# Patient Record
Sex: FEMALE | Race: BLACK OR AFRICAN AMERICAN | NOT HISPANIC OR LATINO
[De-identification: names, ages, dates, MRNs, and addresses within clinical notes are randomized per-mention and may not be internally consistent; named-entity substitution may affect disease eponyms.]

---

## 2017-08-01 ENCOUNTER — APPOINTMENT (OUTPATIENT)
Dept: OTOLARYNGOLOGY | Facility: CLINIC | Age: 72
End: 2017-08-01

## 2024-05-31 ENCOUNTER — INPATIENT (INPATIENT)
Facility: HOSPITAL | Age: 79
LOS: 5 days | Discharge: HOME | End: 2024-06-06
Attending: INTERNAL MEDICINE | Admitting: INTERNAL MEDICINE
Payer: MEDICARE

## 2024-05-31 VITALS
WEIGHT: 134.92 LBS | DIASTOLIC BLOOD PRESSURE: 86 MMHG | HEART RATE: 63 BPM | RESPIRATION RATE: 25 BRPM | OXYGEN SATURATION: 100 % | HEIGHT: 68 IN | TEMPERATURE: 98 F | SYSTOLIC BLOOD PRESSURE: 142 MMHG

## 2024-05-31 DIAGNOSIS — R06.02 SHORTNESS OF BREATH: ICD-10-CM

## 2024-05-31 DIAGNOSIS — R06.00 DYSPNEA, UNSPECIFIED: ICD-10-CM

## 2024-05-31 DIAGNOSIS — R55 SYNCOPE AND COLLAPSE: ICD-10-CM

## 2024-05-31 DIAGNOSIS — R00.1 BRADYCARDIA, UNSPECIFIED: ICD-10-CM

## 2024-05-31 DIAGNOSIS — Z86.73 PERSONAL HISTORY OF TRANSIENT ISCHEMIC ATTACK (TIA), AND CEREBRAL INFARCTION WITHOUT RESIDUAL DEFICITS: ICD-10-CM

## 2024-05-31 LAB
ALBUMIN SERPL ELPH-MCNC: 4.2 G/DL — SIGNIFICANT CHANGE UP (ref 3.3–5)
ALP SERPL-CCNC: 74 U/L — SIGNIFICANT CHANGE UP (ref 40–120)
ALT FLD-CCNC: 20 U/L — SIGNIFICANT CHANGE UP (ref 4–33)
ANION GAP SERPL CALC-SCNC: 13 MMOL/L — SIGNIFICANT CHANGE UP (ref 7–14)
APTT BLD: 30.1 SEC — SIGNIFICANT CHANGE UP (ref 24.5–35.6)
AST SERPL-CCNC: 28 U/L — SIGNIFICANT CHANGE UP (ref 4–32)
BASE EXCESS BLDV CALC-SCNC: -0.7 MMOL/L — SIGNIFICANT CHANGE UP (ref -2–3)
BASE EXCESS BLDV CALC-SCNC: 0.8 MMOL/L — SIGNIFICANT CHANGE UP (ref -2–3)
BASOPHILS # BLD AUTO: 0.05 K/UL — SIGNIFICANT CHANGE UP (ref 0–0.2)
BASOPHILS NFR BLD AUTO: 1 % — SIGNIFICANT CHANGE UP (ref 0–2)
BILIRUB SERPL-MCNC: 0.3 MG/DL — SIGNIFICANT CHANGE UP (ref 0.2–1.2)
BLOOD GAS VENOUS COMPREHENSIVE RESULT: SIGNIFICANT CHANGE UP
BLOOD GAS VENOUS COMPREHENSIVE RESULT: SIGNIFICANT CHANGE UP
BUN SERPL-MCNC: 14 MG/DL — SIGNIFICANT CHANGE UP (ref 7–23)
CALCIUM SERPL-MCNC: 9.9 MG/DL — SIGNIFICANT CHANGE UP (ref 8.4–10.5)
CHLORIDE BLDV-SCNC: 107 MMOL/L — SIGNIFICANT CHANGE UP (ref 96–108)
CHLORIDE BLDV-SCNC: 108 MMOL/L — SIGNIFICANT CHANGE UP (ref 96–108)
CHLORIDE SERPL-SCNC: 107 MMOL/L — SIGNIFICANT CHANGE UP (ref 98–107)
CK MB BLD-MCNC: 1.3 % — SIGNIFICANT CHANGE UP (ref 0–2.5)
CK MB CFR SERPL CALC: 1.3 NG/ML — SIGNIFICANT CHANGE UP
CK SERPL-CCNC: 97 U/L — SIGNIFICANT CHANGE UP (ref 25–170)
CO2 BLDV-SCNC: 25 MMOL/L — SIGNIFICANT CHANGE UP (ref 22–26)
CO2 BLDV-SCNC: 26.3 MMOL/L — HIGH (ref 22–26)
CO2 SERPL-SCNC: 22 MMOL/L — SIGNIFICANT CHANGE UP (ref 22–31)
CREAT SERPL-MCNC: 0.73 MG/DL — SIGNIFICANT CHANGE UP (ref 0.5–1.3)
EGFR: 84 ML/MIN/1.73M2 — SIGNIFICANT CHANGE UP
EOSINOPHIL # BLD AUTO: 0.17 K/UL — SIGNIFICANT CHANGE UP (ref 0–0.5)
EOSINOPHIL NFR BLD AUTO: 3.3 % — SIGNIFICANT CHANGE UP (ref 0–6)
FLUAV AG NPH QL: SIGNIFICANT CHANGE UP
FLUBV AG NPH QL: SIGNIFICANT CHANGE UP
GAS PNL BLDV: 138 MMOL/L — SIGNIFICANT CHANGE UP (ref 136–145)
GAS PNL BLDV: 140 MMOL/L — SIGNIFICANT CHANGE UP (ref 136–145)
GAS PNL BLDV: SIGNIFICANT CHANGE UP
GAS PNL BLDV: SIGNIFICANT CHANGE UP
GLUCOSE BLDV-MCNC: 77 MG/DL — SIGNIFICANT CHANGE UP (ref 70–99)
GLUCOSE BLDV-MCNC: 79 MG/DL — SIGNIFICANT CHANGE UP (ref 70–99)
GLUCOSE SERPL-MCNC: 79 MG/DL — SIGNIFICANT CHANGE UP (ref 70–99)
HCO3 BLDV-SCNC: 24 MMOL/L — SIGNIFICANT CHANGE UP (ref 22–29)
HCO3 BLDV-SCNC: 25 MMOL/L — SIGNIFICANT CHANGE UP (ref 22–29)
HCT VFR BLD CALC: 34.8 % — SIGNIFICANT CHANGE UP (ref 34.5–45)
HCT VFR BLDA CALC: 36 % — SIGNIFICANT CHANGE UP (ref 34.5–46.5)
HCT VFR BLDA CALC: 38 % — SIGNIFICANT CHANGE UP (ref 34.5–46.5)
HGB BLD CALC-MCNC: 12 G/DL — SIGNIFICANT CHANGE UP (ref 11.7–16.1)
HGB BLD CALC-MCNC: 12.7 G/DL — SIGNIFICANT CHANGE UP (ref 11.7–16.1)
HGB BLD-MCNC: 12.3 G/DL — SIGNIFICANT CHANGE UP (ref 11.5–15.5)
IANC: 2.19 K/UL — SIGNIFICANT CHANGE UP (ref 1.8–7.4)
IMM GRANULOCYTES NFR BLD AUTO: 0.2 % — SIGNIFICANT CHANGE UP (ref 0–0.9)
INR BLD: 1.04 RATIO — SIGNIFICANT CHANGE UP (ref 0.85–1.18)
LACTATE BLDV-MCNC: 1.2 MMOL/L — SIGNIFICANT CHANGE UP (ref 0.5–2)
LACTATE BLDV-MCNC: 2.3 MMOL/L — HIGH (ref 0.5–2)
LYMPHOCYTES # BLD AUTO: 1.92 K/UL — SIGNIFICANT CHANGE UP (ref 1–3.3)
LYMPHOCYTES # BLD AUTO: 37.4 % — SIGNIFICANT CHANGE UP (ref 13–44)
MCHC RBC-ENTMCNC: 33.8 PG — SIGNIFICANT CHANGE UP (ref 27–34)
MCHC RBC-ENTMCNC: 35.3 GM/DL — SIGNIFICANT CHANGE UP (ref 32–36)
MCV RBC AUTO: 95.6 FL — SIGNIFICANT CHANGE UP (ref 80–100)
MONOCYTES # BLD AUTO: 0.8 K/UL — SIGNIFICANT CHANGE UP (ref 0–0.9)
MONOCYTES NFR BLD AUTO: 15.6 % — HIGH (ref 2–14)
NEUTROPHILS # BLD AUTO: 2.19 K/UL — SIGNIFICANT CHANGE UP (ref 1.8–7.4)
NEUTROPHILS NFR BLD AUTO: 42.5 % — LOW (ref 43–77)
NRBC # BLD: 0 /100 WBCS — SIGNIFICANT CHANGE UP (ref 0–0)
NRBC # FLD: 0 K/UL — SIGNIFICANT CHANGE UP (ref 0–0)
NT-PROBNP SERPL-SCNC: 111 PG/ML — SIGNIFICANT CHANGE UP
PCO2 BLDV: 33 MMHG — LOW (ref 39–52)
PCO2 BLDV: 44 MMHG — SIGNIFICANT CHANGE UP (ref 39–52)
PH BLDV: 7.36 — SIGNIFICANT CHANGE UP (ref 7.32–7.43)
PH BLDV: 7.47 — HIGH (ref 7.32–7.43)
PLATELET # BLD AUTO: 223 K/UL — SIGNIFICANT CHANGE UP (ref 150–400)
PO2 BLDV: 20 MMHG — LOW (ref 25–45)
PO2 BLDV: <20 MMHG — LOW (ref 25–45)
POTASSIUM BLDV-SCNC: 3.8 MMOL/L — SIGNIFICANT CHANGE UP (ref 3.5–5.1)
POTASSIUM BLDV-SCNC: 4.2 MMOL/L — SIGNIFICANT CHANGE UP (ref 3.5–5.1)
POTASSIUM SERPL-MCNC: 3.8 MMOL/L — SIGNIFICANT CHANGE UP (ref 3.5–5.3)
POTASSIUM SERPL-SCNC: 3.8 MMOL/L — SIGNIFICANT CHANGE UP (ref 3.5–5.3)
PROT SERPL-MCNC: 7.4 G/DL — SIGNIFICANT CHANGE UP (ref 6–8.3)
PROTHROM AB SERPL-ACNC: 11.6 SEC — SIGNIFICANT CHANGE UP (ref 9.5–13)
RBC # BLD: 3.64 M/UL — LOW (ref 3.8–5.2)
RBC # FLD: 14 % — SIGNIFICANT CHANGE UP (ref 10.3–14.5)
RSV RNA NPH QL NAA+NON-PROBE: SIGNIFICANT CHANGE UP
SAO2 % BLDV: 23.5 % — LOW (ref 67–88)
SAO2 % BLDV: 24.6 % — LOW (ref 67–88)
SARS-COV-2 RNA SPEC QL NAA+PROBE: SIGNIFICANT CHANGE UP
SODIUM SERPL-SCNC: 142 MMOL/L — SIGNIFICANT CHANGE UP (ref 135–145)
TROPONIN T, HIGH SENSITIVITY RESULT: 10 NG/L — SIGNIFICANT CHANGE UP
TROPONIN T, HIGH SENSITIVITY RESULT: 10 NG/L — SIGNIFICANT CHANGE UP
WBC # BLD: 5.14 K/UL — SIGNIFICANT CHANGE UP (ref 3.8–10.5)
WBC # FLD AUTO: 5.14 K/UL — SIGNIFICANT CHANGE UP (ref 3.8–10.5)

## 2024-05-31 PROCEDURE — 71275 CT ANGIOGRAPHY CHEST: CPT | Mod: 26,MC

## 2024-05-31 PROCEDURE — 99223 1ST HOSP IP/OBS HIGH 75: CPT

## 2024-05-31 PROCEDURE — 71045 X-RAY EXAM CHEST 1 VIEW: CPT | Mod: 26

## 2024-05-31 PROCEDURE — 70450 CT HEAD/BRAIN W/O DYE: CPT | Mod: 26,MC

## 2024-05-31 PROCEDURE — 99285 EMERGENCY DEPT VISIT HI MDM: CPT

## 2024-05-31 RX ORDER — ASPIRIN/CALCIUM CARB/MAGNESIUM 324 MG
1 TABLET ORAL
Refills: 0 | DISCHARGE

## 2024-05-31 RX ORDER — FERROUS SULFATE 325(65) MG
325 TABLET ORAL DAILY
Refills: 0 | Status: DISCONTINUED | OUTPATIENT
Start: 2024-05-31 | End: 2024-06-06

## 2024-05-31 RX ORDER — SODIUM CHLORIDE 9 MG/ML
500 INJECTION INTRAMUSCULAR; INTRAVENOUS; SUBCUTANEOUS ONCE
Refills: 0 | Status: COMPLETED | OUTPATIENT
Start: 2024-05-31 | End: 2024-05-31

## 2024-05-31 RX ORDER — CLOPIDOGREL BISULFATE 75 MG/1
1 TABLET, FILM COATED ORAL
Refills: 0 | DISCHARGE

## 2024-05-31 RX ORDER — ASPIRIN/CALCIUM CARB/MAGNESIUM 324 MG
81 TABLET ORAL DAILY
Refills: 0 | Status: DISCONTINUED | OUTPATIENT
Start: 2024-05-31 | End: 2024-06-06

## 2024-05-31 RX ORDER — FAMOTIDINE 10 MG/ML
1 INJECTION INTRAVENOUS
Refills: 0 | DISCHARGE

## 2024-05-31 RX ORDER — FERROUS SULFATE 325(65) MG
1 TABLET ORAL
Refills: 0 | DISCHARGE

## 2024-05-31 RX ORDER — FAMOTIDINE 10 MG/ML
20 INJECTION INTRAVENOUS DAILY
Refills: 0 | Status: DISCONTINUED | OUTPATIENT
Start: 2024-05-31 | End: 2024-06-06

## 2024-05-31 RX ORDER — LANOLIN ALCOHOL/MO/W.PET/CERES
2 CREAM (GRAM) TOPICAL
Refills: 0 | DISCHARGE

## 2024-05-31 RX ORDER — CLOPIDOGREL BISULFATE 75 MG/1
75 TABLET, FILM COATED ORAL DAILY
Refills: 0 | Status: DISCONTINUED | OUTPATIENT
Start: 2024-05-31 | End: 2024-06-06

## 2024-05-31 RX ORDER — LANOLIN ALCOHOL/MO/W.PET/CERES
3 CREAM (GRAM) TOPICAL AT BEDTIME
Refills: 0 | Status: DISCONTINUED | OUTPATIENT
Start: 2024-05-31 | End: 2024-06-06

## 2024-05-31 RX ADMIN — SODIUM CHLORIDE 500 MILLILITER(S): 9 INJECTION INTRAMUSCULAR; INTRAVENOUS; SUBCUTANEOUS at 17:13

## 2024-05-31 RX ADMIN — Medication 3 MILLIGRAM(S): at 22:33

## 2024-05-31 NOTE — H&P ADULT - NSHPREVIEWOFSYSTEMS_GEN_ALL_CORE
Review of Systems:   CONSTITUTIONAL: No fever or chills  EYES: No eye pain, visual disturbances, or discharge  ENMT:  No difficulty hearing, no throat pain  NECK: No pain or stiffness  RESPIRATORY: No cough, + shortness of breath  CARDIOVASCULAR: No chest pain, palpitations, or leg swelling  GASTROINTESTINAL: No abdominal pain, nausea, vomiting or diarrhea  GENITOURINARY: No dysuria, or hematuria  NEUROLOGICAL: vertigo, near syncope  SKIN: No rashes, or lesions   LYMPH NODES: No enlarged glands  ENDOCRINE: No heat or cold intolerance  MUSCULOSKELETAL: No joint pain or swelling  PSYCHIATRIC: No depression or anxiety  HEME/LYMPH: No easy bruising, or bleeding  ALLERGY AND IMMUNOLOGIC: No hives or eczema

## 2024-05-31 NOTE — PATIENT PROFILE ADULT - FALL HARM RISK - TYPE OF ASSESSMENT
Presents with a fever since yesterday.    Patient's child came back covid positive.   Patient has MS and takes medications that make her immunocompromised.    Admission

## 2024-05-31 NOTE — ED ADULT NURSE NOTE - OBJECTIVE STATEMENT
BREAK RN: A&OX4, awake, and alert, ambulatory, h/o stroke no residual weakness, vertigo. Patient is coming to ED in regards to SOB and near syncopal episode. Patient states she was at PT then "fell on staff", patient endorses dizziness at this time and SOB worsen with movement and ambulation. Patient denies LOC, head strike, or AC use. Patient placed on 5L NC, MD FUAD [place] made aware, 20G IV placed to LF, NSR. labs drawn and sent will continue to monitor.

## 2024-05-31 NOTE — ED PROVIDER NOTE - PHYSICAL EXAMINATION
GENERAL: NAD  HEENT:  Atraumatic  CHEST/LUNG: Chest rise equal bilaterally, mildly tachypneic  HEART: Regular rate and rhythm  ABDOMEN: Soft, Nontender, Nondistended  EXTREMITIES:  Extremities warm  PSYCH: A&Ox3  SKIN: No obvious rashes or lesions  MSK: No cervical spine TTP, able to range neck to the left and right  NEUROLOGY: strength and sensation intact in all extremities. Ambulatory w/ walker/difficulty at baseline since CVA

## 2024-05-31 NOTE — H&P ADULT - HISTORY OF PRESENT ILLNESS
77 y/o F with pmh of CVA (Feb 2023) and vertigo p/w dyspnea and near syncope.  Pt reports she was at PT today and working on one of the machines there when she became very dyspneic, and felt as if she would pass out.  She did not have chest pain or palpitations.  Daughters state that they have noticed pt has appeared very dyspneic with minimal exertion for for the past several months, but pt typically does not complain to them about these symptoms.  Pt reports chronic vertigo with nausea and occasional vomiting, but no change in these symptoms recently.  No recent cold symptoms, cough, abd pain, or diarrhea.      Pt previously was living in [place] and was admitted in a hospital there last February for CVA.  At that time, EMS was called, because pt did not answer her door for 2 days, and was found on the floor confused.  Daughters report that while admitted, she was diagnosed with a CVA.  She had workup with cath during that admission, but unsure what it showed.  She also had ILR placed, but has not used the recorder remote.      In the ED, pt was given 500cc NS.

## 2024-05-31 NOTE — ED PROVIDER NOTE - CLINICAL SUMMARY MEDICAL DECISION MAKING FREE TEXT BOX
78-year-old female with past medical history of CVA last year, ambulatory with a walker at baseline complaining of 1 day history of shortness of breath.  Patient was performing exercises at physical therapy when she developed shortness of breath, prompting ED visit.  Patient admits that she was mildly lightheaded and weak during physical therapy which has improved since ED arrival.  Currently short of breath, otherwise asymptomatic at this time.  Uses Plavix, no other anticoagulation. Denies fevers, chills, nausea, vomiting, dizziness, chest pain, abdominal pain, dysuria, hematuria. Tachypneic, otherwise benign examination. Will screen for ACS (troponin), anemia/electrolytes, and chest x ray to screen for cardiopulmonary pathology. BNP For heart failure. CT pulm angio for PE screen and reasess w/ likely admission for cardiac workup.

## 2024-05-31 NOTE — H&P ADULT - PROBLEM SELECTOR PLAN 1
65
Sinus bradycardia.   - monitor on tele  - TTE in am  - will need EP eval in am  - appears that pt has [employer] ILR placed last year but pt did not push button on remote for recordings.  Will check with EP if possible to interrogate.

## 2024-05-31 NOTE — H&P ADULT - NSHPLABSRESULTS_GEN_ALL_CORE
12.3   5.14  )-----------( 223      ( 31 May 2024 17:09 )             34.8     142  |  107  |  14  ----------------------------<  79     05-31  3.8   |  22  |  0.73    Ca    9.9      31 May 2024 17:09    TPro  7.4  /  Alb  4.2  /  TBili  0.3  /  DBili  x   /  AST  28  /  ALT  20  /  AlkPhos  74  05-31    PT/INR: 11.6/1.04 (05-31-24 @ 17:09)  PTT: 30.1 (05-31-24 @ 17:09)      18:52 - VBG - pH: 7.36  | pCO2: 44    | pO2: 20    | Lactate: 1.2    17:09 - VBG - pH: 7.47  | pCO2: 33    | pO2: <20   | Lactate: 2.3            hs Troponin, T - 10 ng/L (05-31-24 @ 18:52)  hs Troponin, T - 10 ng/L (05-31-24 @ 17:09)      Pro-BNP: 111 (05-31-24 @ 17:09)          < from: CT Head No Cont (05.31.24 @ 18:07) >    No acute intracranial hemorrhage, mass effect or recent infarct.    Small chronic left parietal cortical infarct and moderate white matter   microangiopathy.    < end of copied text >    < from: CT Angio Chest PE Protocol w/ IV Cont (05.31.24 @ 18:07) >    IMPRESSION: No pulmonary embolus.    Multiple subcentimeter lung nodules; recommend a 6 month follow-up chest   CT.    < end of copied text >

## 2024-05-31 NOTE — ED ADULT TRIAGE NOTE - CHIEF COMPLAINT QUOTE
Pt had a near syncopal episode today during PT and was helped to a chair.  Pt c/o dizziness and SOB.   Pt has hx vertigo, stroke,  Dr. Rowe evaluated pt, no Code stroke

## 2024-05-31 NOTE — ED PROVIDER NOTE - PROGRESS NOTE DETAILS
Ti CHACON: Pt is comfortable on 4L NC. Initial troponin negative. Will admit to teledoc for cardiac workup.

## 2024-05-31 NOTE — ED PROVIDER NOTE - ATTENDING CONTRIBUTION TO CARE
Attending Statement: I have personally seen and examined this patient. I have fully participated in the care of this patient. I have reviewed all pertinent clinical information, including history physical exam, plan and the Resident's note and agree except as noted  78-year-old female history of CVA ambulatory with a walker brought in by EMS from PT for feeling shortness of breath and a syncopal episode.  Patient states she has been feeling short of breath since yesterday, has been intermittent worse with exertion.  Denies any associated chest pain.  Today she went to PT and when she started exercising she felt lightheaded and almost passed out.  Increased short of breath prompted ER visit.  Denies chest pain at this time does feel short of breath.  Denies any fever or chills at home no abdominal pain no nausea, vomiting, diarrhea.  No urinary complaints.  No falls or head trauma.  No history of PEs or DVTs.  No recent travel.  No recent hospitalization.  Vital stable patient noted to be tachypneic but not tachycardic.  Pulse ox low 90s placed on 2 L for comfort.  Patient appears anxious sitting up.  Able to answer questions in short sentences.  Clear entry bilaterally with no wheezing no rales.  Nontender abdomen.  No calf tenderness bilaterally.  Patient ANO x 3 with daughter at bedside  Plan cardiac monitor, EKG, labs, CT head CT angio rule out PE, admission  EKG heart rate 59 sinus no ST elevations

## 2024-05-31 NOTE — H&P ADULT - NSHPPHYSICALEXAM_GEN_ALL_CORE
Vital Signs Last 24 Hrs  T(C): 36.4 (31 May 2024 15:50), Max: 36.4 (31 May 2024 15:50)  T(F): 97.6 (31 May 2024 15:50), Max: 97.6 (31 May 2024 15:50)  HR: 46 (31 May 2024 20:43) (46 - 63)  BP: 161/73 (31 May 2024 20:43) (142/86 - 161/73)  BP(mean): --  RR: 18 (31 May 2024 20:43) (18 - 25)  SpO2: 100% (31 May 2024 20:43) (100% - 100%)    Parameters below as of 31 May 2024 20:43  Patient On (Oxygen Delivery Method): room air        PHYSICAL EXAM:  GENERAL: No Acute Distress  EYES: conjunctiva and sclera clear  ENMT: Moist mucous membranes   NECK: Supple  PULMONARY: Clear to auscultation bilaterally  CARDIAC: Regular rate and rhythm; No murmurs, rubs, or gallops  GASTROINTESTINAL: Abdomen soft, Nontender, Nondistended; Bowel sounds normal  EXTREMITIES:   No clubbing, cyanosis, or pedal edema  PSYCH: Normal Affect, Normal Behavior  NEUROLOGY:   - Mental status A&O x 3,  - Cranial Nerves II-XII intact  - Motor   - Sensory  - Coordination  SKIN: No rashes or lesions  MUSCULOSKELETAL: No joint swelling Vital Signs Last 24 Hrs  T(C): 36.4 (31 May 2024 15:50), Max: 36.4 (31 May 2024 15:50)  T(F): 97.6 (31 May 2024 15:50), Max: 97.6 (31 May 2024 15:50)  HR: 46 (31 May 2024 20:43) (46 - 63)  BP: 161/73 (31 May 2024 20:43) (142/86 - 161/73)  BP(mean): --  RR: 18 (31 May 2024 20:43) (18 - 25)  SpO2: 100% (31 May 2024 20:43) (100% - 100%)    Parameters below as of 31 May 2024 20:43  Patient On (Oxygen Delivery Method): room air        PHYSICAL EXAM:  GENERAL: No Acute Distress  EYES: conjunctiva and sclera clear  ENMT: Moist mucous membranes   NECK: Supple  PULMONARY: Clear to auscultation bilaterally  CARDIAC: Bradycardia, regular rhythm; No murmurs, rubs, or gallops  GASTROINTESTINAL: Abdomen soft, Nontender, Nondistended; Bowel sounds normal  EXTREMITIES:   No clubbing, cyanosis, or pedal edema  PSYCH: Normal Affect, Normal Behavior  NEUROLOGY:   - Mental status A&O x 3  SKIN: No rashes or lesions  MUSCULOSKELETAL: No joint swelling

## 2024-05-31 NOTE — PATIENT PROFILE ADULT - FALL HARM RISK - HARM RISK INTERVENTIONS
Assistance with ambulation/Assistance OOB with selected safe patient handling equipment/Communicate Risk of Fall with Harm to all staff/Discuss with provider need for PT consult/Monitor gait and stability/Provide patient with walking aids - walker, cane, crutches/Reinforce activity limits and safety measures with patient and family/Sit up slowly, dangle for a short time, stand at bedside before walking/Tailored Fall Risk Interventions/Visual Cue: Yellow wristband and red socks/Bed in lowest position, wheels locked, appropriate side rails in place/Call bell, personal items and telephone in reach/Instruct patient to call for assistance before getting out of bed or chair/Non-slip footwear when patient is out of bed/[place] to call system/Physically safe environment - no spills, clutter or unnecessary equipment/Purposeful Proactive Rounding/Room/bathroom lighting operational, light cord in reach

## 2024-06-01 ENCOUNTER — RESULT REVIEW (OUTPATIENT)
Age: 79
End: 2024-06-01

## 2024-06-01 DIAGNOSIS — Z29.9 ENCOUNTER FOR PROPHYLACTIC MEASURES, UNSPECIFIED: ICD-10-CM

## 2024-06-01 LAB
ANION GAP SERPL CALC-SCNC: 11 MMOL/L — SIGNIFICANT CHANGE UP (ref 7–14)
BUN SERPL-MCNC: 11 MG/DL — SIGNIFICANT CHANGE UP (ref 7–23)
CALCIUM SERPL-MCNC: 8.9 MG/DL — SIGNIFICANT CHANGE UP (ref 8.4–10.5)
CHLORIDE SERPL-SCNC: 109 MMOL/L — HIGH (ref 98–107)
CO2 SERPL-SCNC: 23 MMOL/L — SIGNIFICANT CHANGE UP (ref 22–31)
CREAT SERPL-MCNC: 0.75 MG/DL — SIGNIFICANT CHANGE UP (ref 0.5–1.3)
EGFR: 81 ML/MIN/1.73M2 — SIGNIFICANT CHANGE UP
GLUCOSE SERPL-MCNC: 81 MG/DL — SIGNIFICANT CHANGE UP (ref 70–99)
HCT VFR BLD CALC: 30.7 % — LOW (ref 34.5–45)
HGB BLD-MCNC: 10.7 G/DL — LOW (ref 11.5–15.5)
MAGNESIUM SERPL-MCNC: 1.8 MG/DL — SIGNIFICANT CHANGE UP (ref 1.6–2.6)
MCHC RBC-ENTMCNC: 33.8 PG — SIGNIFICANT CHANGE UP (ref 27–34)
MCHC RBC-ENTMCNC: 34.9 GM/DL — SIGNIFICANT CHANGE UP (ref 32–36)
MCV RBC AUTO: 96.8 FL — SIGNIFICANT CHANGE UP (ref 80–100)
NRBC # BLD: 0 /100 WBCS — SIGNIFICANT CHANGE UP (ref 0–0)
NRBC # FLD: 0 K/UL — SIGNIFICANT CHANGE UP (ref 0–0)
PHOSPHATE SERPL-MCNC: 4.4 MG/DL — SIGNIFICANT CHANGE UP (ref 2.5–4.5)
PLATELET # BLD AUTO: 182 K/UL — SIGNIFICANT CHANGE UP (ref 150–400)
POTASSIUM SERPL-MCNC: 3.9 MMOL/L — SIGNIFICANT CHANGE UP (ref 3.5–5.3)
POTASSIUM SERPL-SCNC: 3.9 MMOL/L — SIGNIFICANT CHANGE UP (ref 3.5–5.3)
RBC # BLD: 3.17 M/UL — LOW (ref 3.8–5.2)
RBC # FLD: 14.2 % — SIGNIFICANT CHANGE UP (ref 10.3–14.5)
SODIUM SERPL-SCNC: 143 MMOL/L — SIGNIFICANT CHANGE UP (ref 135–145)
WBC # BLD: 4.15 K/UL — SIGNIFICANT CHANGE UP (ref 3.8–10.5)
WBC # FLD AUTO: 4.15 K/UL — SIGNIFICANT CHANGE UP (ref 3.8–10.5)

## 2024-06-01 PROCEDURE — 93306 TTE W/DOPPLER COMPLETE: CPT | Mod: 26

## 2024-06-01 RX ORDER — ENOXAPARIN SODIUM 100 MG/ML
40 INJECTION SUBCUTANEOUS EVERY 24 HOURS
Refills: 0 | Status: DISCONTINUED | OUTPATIENT
Start: 2024-06-01 | End: 2024-06-06

## 2024-06-01 RX ORDER — ATORVASTATIN CALCIUM 80 MG/1
10 TABLET, FILM COATED ORAL AT BEDTIME
Refills: 0 | Status: DISCONTINUED | OUTPATIENT
Start: 2024-06-01 | End: 2024-06-06

## 2024-06-01 RX ADMIN — ATORVASTATIN CALCIUM 10 MILLIGRAM(S): 80 TABLET, FILM COATED ORAL at 21:50

## 2024-06-01 RX ADMIN — Medication 81 MILLIGRAM(S): at 11:09

## 2024-06-01 RX ADMIN — FAMOTIDINE 20 MILLIGRAM(S): 10 INJECTION INTRAVENOUS at 11:09

## 2024-06-01 RX ADMIN — CLOPIDOGREL BISULFATE 75 MILLIGRAM(S): 75 TABLET, FILM COATED ORAL at 11:09

## 2024-06-01 RX ADMIN — Medication 325 MILLIGRAM(S): at 11:09

## 2024-06-01 RX ADMIN — Medication 1 TABLET(S): at 11:09

## 2024-06-01 NOTE — CONSULT NOTE ADULT - ASSESSMENT
A/P    77 y/o F with pmh of CVA (Feb 2023) and vertigo p/w dyspnea and near syncope.      #Bradycardia.   -sinus chalo 40's not on avn meds  -chronic "vertigo" sx, ? symptomatic chalo   -ilr interrogation noted  -eps eval called   -tele  -check tte    #Near syncope  -chronic vertigo sx  -tele with sinus chalo  no evidence of pathologic av block   -ilr eval no correlating events  -check tte  -eps eval     #Dyspnea.   -now resolved  -on room air  -no clinical HF  -cta with no pe, effusions, infiltrates  -check tte, r/o shunting   -?related to chalo/chronotropic incompetence  -eps eval     #CVA  -continue ASA and Plavix.      dvt ppx    79 minutes spent on total encounter; more than 50% of the visit was spent counseling and/or coordinating care by the attending physician.      ACP- Advanced Care Planning  -Advanced care planning discussed with patient. Advanced care planning forms discussed with patient and/or family.  Risks, benefits, and alternatives of medical/cardiac procedures were discussed in detail with all questions answered.  30 minutes were spent addressing advance care planning.

## 2024-06-01 NOTE — PROCEDURE NOTE - ADDITIONAL PROCEDURE DETAILS
Device interogation for near syncope:     Sensitivity: 0.035mV, QRS amplitude 1.2mV  PVC <1%  Time in AT/AF : lifetime 0    No arrhythmia events noted corresponding to the reported symptoms.

## 2024-06-01 NOTE — CONSULT NOTE ADULT - ASSESSMENT
Assessment and recommendation :  SOB and presyncope   Bilateral multiple lung nodules   Chronic vertigo   H/O CVA   S/P ILD   thrombocytopenia possible MSD    sinus bradycardia   EP evaluation   follow CT scan in 6 month   DVT prophylaxis   estimated time is 70 minutes more than 50 % in counselling the patient and communicating with other consultant

## 2024-06-01 NOTE — CONSULT NOTE ADULT - PROBLEM SELECTOR RECOMMENDATION 9
off AVN blockade  ILR eval w no events at the time of dyspnea  EP eval as per Cardiology  check TFTs  check TTE  check Lyme titer

## 2024-06-01 NOTE — CONSULT NOTE ADULT - ASSESSMENT
79 y/o F with pmh of CVA (Feb 2023) and vertigo p/w dyspnea and near syncope. Found to be bradycardic off AVN blockers

## 2024-06-02 LAB
A1C WITH ESTIMATED AVERAGE GLUCOSE RESULT: 5 % — SIGNIFICANT CHANGE UP (ref 4–5.6)
CHOLEST SERPL-MCNC: 176 MG/DL — SIGNIFICANT CHANGE UP
ESTIMATED AVERAGE GLUCOSE: 97 — SIGNIFICANT CHANGE UP
HDLC SERPL-MCNC: 55 MG/DL — SIGNIFICANT CHANGE UP
LIPID PNL WITH DIRECT LDL SERPL: 110 MG/DL — HIGH
NON HDL CHOLESTEROL: 121 MG/DL — SIGNIFICANT CHANGE UP
T3 SERPL-MCNC: 134 NG/DL — SIGNIFICANT CHANGE UP (ref 80–200)
T4 AB SER-ACNC: 7.13 UG/DL — SIGNIFICANT CHANGE UP (ref 5.1–13)
TRIGL SERPL-MCNC: 55 MG/DL — SIGNIFICANT CHANGE UP
TSH SERPL-MCNC: 1.62 UIU/ML — SIGNIFICANT CHANGE UP (ref 0.27–4.2)

## 2024-06-02 PROCEDURE — 70551 MRI BRAIN STEM W/O DYE: CPT | Mod: 26

## 2024-06-02 RX ORDER — SODIUM CHLORIDE 9 MG/ML
250 INJECTION INTRAMUSCULAR; INTRAVENOUS; SUBCUTANEOUS ONCE
Refills: 0 | Status: COMPLETED | OUTPATIENT
Start: 2024-06-02 | End: 2024-06-02

## 2024-06-02 RX ORDER — SODIUM CHLORIDE 9 MG/ML
1000 INJECTION INTRAMUSCULAR; INTRAVENOUS; SUBCUTANEOUS
Refills: 0 | Status: DISCONTINUED | OUTPATIENT
Start: 2024-06-02 | End: 2024-06-04

## 2024-06-02 RX ADMIN — Medication 325 MILLIGRAM(S): at 12:36

## 2024-06-02 RX ADMIN — Medication 81 MILLIGRAM(S): at 12:35

## 2024-06-02 RX ADMIN — Medication 1 TABLET(S): at 12:35

## 2024-06-02 RX ADMIN — FAMOTIDINE 20 MILLIGRAM(S): 10 INJECTION INTRAVENOUS at 12:36

## 2024-06-02 RX ADMIN — ATORVASTATIN CALCIUM 10 MILLIGRAM(S): 80 TABLET, FILM COATED ORAL at 21:40

## 2024-06-02 RX ADMIN — SODIUM CHLORIDE 500 MILLILITER(S): 9 INJECTION INTRAMUSCULAR; INTRAVENOUS; SUBCUTANEOUS at 00:56

## 2024-06-02 RX ADMIN — CLOPIDOGREL BISULFATE 75 MILLIGRAM(S): 75 TABLET, FILM COATED ORAL at 12:35

## 2024-06-02 NOTE — CONSULT NOTE ADULT - ASSESSMENT
78 years old woman with PMH of CVA and vertigo presenting for an episode of dyspnea, vertigo and incomprehensible speech. Exam is nonfocal.    impression:   1) transient episode of dyspnea, vertigo and speech disturbance could be presyncope at this time likely cardiac in etiology  2) hand weakness bilateral and chronic could be chronic bilateral CTS  3) history of stroke with notable chronic left parietal infarct, requires vessel imaging to determine mechanism    plan:  [] obtain 2 hours routine EEG  [] obtain vessel imaging including CTA head and neck for evaluation of vessels to determine stroke mechanism  [x] MRI and EEG obtained  [x] a1c (5), ldl (110)  [] EMG NCS outpatient for CTS  [] rest of care per primary team  [] single antiplatelet is enough for stroke prevention (aspirin 81 mg daily for now is sufficient) unless tele reveals afib  [] continue with telemonitoring  [] high dose statin based on ACVD score    case to be seen by Dr. Cervantes

## 2024-06-02 NOTE — CONSULT NOTE ADULT - ATTENDING COMMENTS
78 years old woman with PMH of CVA and vertigo presenting for an episode of dyspnea, vertigo and incomprehensible speech. Exam is nonfocal. mild generalized weakness. MRI brain -  ILR interrogated    Impression, syncope, vertigo    Cardiac w/u  outpt vestibular therapy  Can follow up with Neurology, Dr. Levon Ashby at [phone]

## 2024-06-02 NOTE — CHART NOTE - NSCHARTNOTEFT_GEN_A_CORE
Called by RN on this 78-year-old female with past medical history of CVA last year, ambulatory with a walker at baseline complaining of 1 day history of shortness of breath for positive orthostatics.     ROS- pt complained of dizziness when she stood up for orthostatic vital signs. Pt states she doesn't drink enough.     Vital Signs Last 24 Hrs  T(C): 36.7 (06-01-24 @ 22:20), Max: 37.1 (06-01-24 @ 12:49)  T(F): 98 (06-01-24 @ 22:20), Max: 98.7 (06-01-24 @ 12:49)  HR: 55 (06-01-24 @ 12:49) (54 - 55)  BP: 140/64 (06-01-24 @ 12:49) (140/64 - 156/68)  BP(mean): --  RR: 17 (06-01-24 @ 22:20) (15 - 18)  SpO2: 100% (06-01-24 @ 22:20) (97% - 100%)    Orthostatic VS  06-01-24 @ 22:20  Lying BP: 150/72 HR: 50  Sitting BP: 167/77 HR: 48  Standing BP: 137/81 HR: 67  Site: upper left arm  Mode: electronic    PE:   lungs clear  no edema noted    -NS 250cc gentle fluid bolus  -recheck orthostatics in am Called by RN on this 78-year-old female with past medical history of CVA last year, ambulatory with a walker at baseline brought to ED for 1 day history of shortness of breath now with positive orthostatics.     ROS- pt complained of dizziness when she stood up for orthostatic vital signs. Pt states she doesn't drink enough. Pt offers no other complaints.     Vital Signs Last 24 Hrs  T(C): 36.7 (06-01-24 @ 22:20), Max: 37.1 (06-01-24 @ 12:49)  T(F): 98 (06-01-24 @ 22:20), Max: 98.7 (06-01-24 @ 12:49)  HR: 55 (06-01-24 @ 12:49) (54 - 55)  BP: 140/64 (06-01-24 @ 12:49) (140/64 - 156/68)  BP(mean): --  RR: 17 (06-01-24 @ 22:20) (15 - 18)  SpO2: 100% (06-01-24 @ 22:20) (97% - 100%)    Orthostatic VS  06-01-24 @ 22:20  Lying BP: 150/72 HR: 50  Sitting BP: 167/77 HR: 48  Standing BP: 137/81 HR: 67  Site: upper left arm  Mode: electronic    PE:   lungs clear  no edema noted    -NS 250cc gentle fluid bolus  -recheck orthostatics in am

## 2024-06-03 ENCOUNTER — RESULT REVIEW (OUTPATIENT)
Age: 79
End: 2024-06-03

## 2024-06-03 LAB
ANION GAP SERPL CALC-SCNC: 10 MMOL/L — SIGNIFICANT CHANGE UP (ref 7–14)
BUN SERPL-MCNC: 18 MG/DL — SIGNIFICANT CHANGE UP (ref 7–23)
CALCIUM SERPL-MCNC: 8.8 MG/DL — SIGNIFICANT CHANGE UP (ref 8.4–10.5)
CHLORIDE SERPL-SCNC: 105 MMOL/L — SIGNIFICANT CHANGE UP (ref 98–107)
CO2 SERPL-SCNC: 23 MMOL/L — SIGNIFICANT CHANGE UP (ref 22–31)
CREAT SERPL-MCNC: 0.74 MG/DL — SIGNIFICANT CHANGE UP (ref 0.5–1.3)
EGFR: 82 ML/MIN/1.73M2 — SIGNIFICANT CHANGE UP
GLUCOSE SERPL-MCNC: 86 MG/DL — SIGNIFICANT CHANGE UP (ref 70–99)
HCT VFR BLD CALC: 33 % — LOW (ref 34.5–45)
HGB BLD-MCNC: 11.4 G/DL — LOW (ref 11.5–15.5)
MAGNESIUM SERPL-MCNC: 1.7 MG/DL — SIGNIFICANT CHANGE UP (ref 1.6–2.6)
MCHC RBC-ENTMCNC: 33.1 PG — SIGNIFICANT CHANGE UP (ref 27–34)
MCHC RBC-ENTMCNC: 34.5 GM/DL — SIGNIFICANT CHANGE UP (ref 32–36)
MCV RBC AUTO: 95.9 FL — SIGNIFICANT CHANGE UP (ref 80–100)
NRBC # BLD: 0 /100 WBCS — SIGNIFICANT CHANGE UP (ref 0–0)
NRBC # FLD: 0 K/UL — SIGNIFICANT CHANGE UP (ref 0–0)
PHOSPHATE SERPL-MCNC: 3.5 MG/DL — SIGNIFICANT CHANGE UP (ref 2.5–4.5)
PLATELET # BLD AUTO: 195 K/UL — SIGNIFICANT CHANGE UP (ref 150–400)
POTASSIUM SERPL-MCNC: 4 MMOL/L — SIGNIFICANT CHANGE UP (ref 3.5–5.3)
POTASSIUM SERPL-SCNC: 4 MMOL/L — SIGNIFICANT CHANGE UP (ref 3.5–5.3)
RBC # BLD: 3.44 M/UL — LOW (ref 3.8–5.2)
RBC # FLD: 14.1 % — SIGNIFICANT CHANGE UP (ref 10.3–14.5)
SODIUM SERPL-SCNC: 138 MMOL/L — SIGNIFICANT CHANGE UP (ref 135–145)
WBC # BLD: 4.58 K/UL — SIGNIFICANT CHANGE UP (ref 3.8–10.5)
WBC # FLD AUTO: 4.58 K/UL — SIGNIFICANT CHANGE UP (ref 3.8–10.5)

## 2024-06-03 PROCEDURE — 93880 EXTRACRANIAL BILAT STUDY: CPT | Mod: 26

## 2024-06-03 PROCEDURE — 99222 1ST HOSP IP/OBS MODERATE 55: CPT | Mod: FS

## 2024-06-03 RX ADMIN — Medication 325 MILLIGRAM(S): at 11:55

## 2024-06-03 RX ADMIN — FAMOTIDINE 20 MILLIGRAM(S): 10 INJECTION INTRAVENOUS at 11:55

## 2024-06-03 RX ADMIN — ATORVASTATIN CALCIUM 10 MILLIGRAM(S): 80 TABLET, FILM COATED ORAL at 21:25

## 2024-06-03 RX ADMIN — Medication 81 MILLIGRAM(S): at 11:55

## 2024-06-03 RX ADMIN — Medication 1 TABLET(S): at 11:55

## 2024-06-03 RX ADMIN — CLOPIDOGREL BISULFATE 75 MILLIGRAM(S): 75 TABLET, FILM COATED ORAL at 11:55

## 2024-06-03 NOTE — CONSULT NOTE ADULT - ASSESSMENT
77 y/o F with pmh of CVA (Feb 2023) and vertigo p/w dyspnea and near syncope.  Pt reports she was at PT today and working on one of the machines there when she became very dyspneic, and felt as if she would pass out.  She did not have chest pain or palpitations.  Daughters state that they have noticed pt has appeared very dyspneic with minimal exertion for for the past several months, but pt typically does not complain to them about these symptoms.  Pt reports chronic vertigo with nausea and occasional vomiting, but no change in these symptoms recently.  No recent cold symptoms, cough, abd pain, or diarrhea.      Pt previously was living in [place] and was admitted in a hospital there last February for CVA.  At that time, EMS was called, because pt did not answer her door for 2 days, and was found on the floor confused.  Daughters report that while admitted, she was diagnosed with a CVA.  She had workup with cath during that admission, but unsure what it showed.  She also had ILR placed, but has not used the recorder remote.      MR Brain 6/2/24: no acute infarct/bleed  TTE 6/1/24 LVEF 67%    1) Near Syncope  2) HX CVA s/p ILR ([employer], SN [id number], 2/16/23)  3) Vertigo      - Continuous telemetric monitoring, past 24hrs reviewed: sinus bradycardia 50s  - Monitor electrolytes and replete K to 4 and Mg to 2  - Continue care per primary team  - ILR interrogated, device working appropriately, no pauses/bradycardia/AT/AF based on settings          EP service [phone]   79 y/o F with pmh of CVA (Feb 2023) and vertigo p/w dyspnea and near syncope.  Pt reports she was at PT today and working on one of the machines there when she became very dyspneic, and felt as if she would pass out.  She did not have chest pain or palpitations.  Daughters state that they have noticed pt has appeared very dyspneic with minimal exertion for for the past several months, but pt typically does not complain to them about these symptoms.  Pt reports chronic vertigo with nausea and occasional vomiting, but no change in these symptoms recently.  No recent cold symptoms, cough, abd pain, or diarrhea.      Pt previously was living in [place] and was admitted in a hospital there last February for CVA.  At that time, EMS was called, because pt did not answer her door for 2 days, and was found on the floor confused.  Daughters report that while admitted, she was diagnosed with a CVA.  She had workup with cath during that admission, but unsure what it showed.  She also had ILR placed, but has not used the recorder remote.      MR Brain 6/2/24: no acute infarct/bleed  TTE 6/1/24 LVEF 67%    1) Near Syncope  2) HX CVA s/p ILR ([employer], SN [id number], 2/16/23)  3) Vertigo      - Continuous telemetric monitoring, past 24hrs reviewed: sinus bradycardia 50s  - Monitor electrolytes and replete K to 4 and Mg to 2  - Continue care per primary team  - ILR interrogated, device working appropriately, no pauses/bradycardia/AT/AF based on settings  - will sign off, re-consult as needed          EP service [phone]

## 2024-06-03 NOTE — CONSULT NOTE ADULT - NS ATTEND AMEND GEN_ALL_CORE FT
79 y/o F with pmh of CVA (Feb 2023) and vertigo p/w dyspnea and near syncope.  Pt reports she was at PT today and working on one of the machines there when she became very dyspneic, and felt as if she would pass out.  She did not have chest pain or palpitations.  Daughters state that they have noticed pt has appeared very dyspneic with minimal exertion for the past several months, but pt typically does not complain to them about these symptoms.  Pt reports chronic vertigo with nausea and occasional vomiting, but no change in these symptoms recently.  No recent cold symptoms, cough, abd pain, or diarrhea.  Pt previously was living in [place] and was admitted in a hospital there last February for CVA.  At that time, EMS was called, because pt did not answer her door for 2 days, and was found on the floor confused.  Daughters report that while admitted, she was diagnosed with a CVA.  She had workup with cath during that admission, but unsure what it showed.  She also had ILR placed, but has not used the recorder remote.  MR Brain 6/2/24: no acute infarct/bleed. TTE 6/1/24 LVEF 67%. ILR interrogated, device working appropriately, no pauses/bradycardia/AT/AF based on settings

## 2024-06-03 NOTE — CONSULT NOTE ADULT - SUBJECTIVE AND OBJECTIVE BOX
Neurology - Consult Note    -  Spectra: [id number] ([facility]), [id number] ([facility])  -    HPI: Patient DONNA VU is a 78y (1945) years old woman with PMH of CVA in Feb 2023 and vertigo presenting for an episode of confusion that happened during her rehabilitation session. Daughter reveals a video where the patient was on a chair mumbling incomprehensible words. She was in a physical therapy session during which she informed the therapist that she cannot continue. She then endorsed shortness of breath then but denies any palpitations. She has had spinning dizziness that persisted for about an hour as well. She vomited multiple times. Symptoms persisted until she presented to the hospital. She otherwise denies any loss of consciousness. Patient otherwise on neurologic review of systems revealed that she has bilateral upper extremity tingling and difficulty using her hands that has been ongoing for about a year now. She otherwise denies any trauma, falls or recent infection.     Review of Systems:    NEUROLOGICAL: +As stated in HPI above  All other review of systems is negative unless indicated above.    Allergies:  penicillin (Other)    PMHx/PSHx/Family Hx: As above, otherwise see below   History of CVA (cerebrovascular accident)    Social Hx:  No current use of tobacco, alcohol, or illicit drugs    Medications:  MEDICATIONS  (STANDING):  aspirin enteric coated 81 milliGRAM(s) Oral daily  atorvastatin 10 milliGRAM(s) Oral at bedtime  clopidogrel Tablet 75 milliGRAM(s) Oral daily  enoxaparin Injectable 40 milliGRAM(s) SubCutaneous every 24 hours  famotidine    Tablet 20 milliGRAM(s) Oral daily  ferrous    sulfate 325 milliGRAM(s) Oral daily  multivitamin 1 Tablet(s) Oral daily  sodium chloride 0.9%. 1000 milliLiter(s) (75 mL/Hr) IV Continuous <Continuous>    MEDICATIONS  (PRN):  melatonin 3 milliGRAM(s) Oral at bedtime PRN Insomnia    Vitals:  T(C): 37 (06-02-24 @ 13:30), Max: 37 (06-02-24 @ 10:56)  HR: 58 (06-02-24 @ 13:30) (53 - 58)  BP: 124/79 (06-02-24 @ 13:30) (118/48 - 124/79)  RR: 17 (06-02-24 @ 13:30) (16 - 17)  SpO2: 100% (06-02-24 @ 13:30) (100% - 100%)    Physical Examination:   General - NAD    Neurologic Exam:  Mental status - Awake, Alert, Oriented to person, place, named the month but could not name the year. Speech fluent, repetition and naming intact. Follows simple and complex commands.     Cranial nerves - PERRLA, VFF, EOMI, face sensation (V1-V3) intact b/l, facial strength intact without asymmetry b/l, hearing intact b/l, tongue midline on protrusion with full lateral movement    Motor - Normal bulk and tone throughout. No pronator drift.  Strength testing            Deltoid      Biceps      Triceps                R            5                 5               5                     5                               L             5                 5               5                     5                               there is notable slight atrophy of the first dorsal interrosei.             Hip Flexion    Hip Extension    Knee Flexion    Knee Extension    Dorsiflexion    Plantar Flexion  R              5                           5                       5                           5                            5                          5  L              5                           5                        5                           5                            5                          5    Sensation - Light touch intact throughout    DTR's -             Biceps      Triceps     Brachioradialis                  Patellar    Ankle    Toes/plantar response  R             1+             1+                  1+                       1+            1+                 Down  L              1+             1+                 1+                        1+           1+                 Down    Coordination - Finger to Nose intact b/l. No tremors appreciated.     Gait and station - Not attempted for safety reasons.     Labs:                        10.7   4.15  )-----------( 182      ( 01 Jun 2024 06:25 )             30.7     06-01    143  |  109<H>  |  11  ----------------------------<  81  3.9   |  23  |  0.75    Ca    8.9      01 Jun 2024 06:25  Phos  4.4     06-01  Mg     1.80     06-01      CAPILLARY BLOOD GLUCOSE              CSF:                  Radiology:  MR Head No Cont:  (02 Jun 2024 11:20)  CT Head No Cont:  (31 May 2024 18:07)  < from: MR Head No Cont (06.02.24 @ 11:20) >  IMPRESSION:    No acute infarction, bleeding, or shift.  Moderate chronic microvascular ischemic changes.    < end of copied text >    TTE June 1st  CONCLUSIONS:      1. Left ventricular cavity is normal in size. Left ventricular wall thickness is normal. Left ventricular systolic function is normal with an ejection fraction of 67 % by Tee's method of disks. There are no regional wall motion abnormalities seen.   2. Normal left ventricular diastolic function, with normal filling pressure.   3. Normal right ventricular cavity size, with normal wall thickness, and normal systolic function.   4. The left atrium is normal in size.   5. The right atrium is normal in size.   6. No pericardial effusion seen.   7. No prior echocardiogram is available for comparison.    
Source: patient and Chart    Patient is a 78y old  Female who presents with a chief complaint of Dyspnea, dizziness (02 Jun 2024 21:21)      HPI:  79 y/o F with pmh of CVA (Feb 2023) and vertigo p/w dyspnea and near syncope.  Pt reports she was at PT today and working on one of the machines there when she became very dyspneic, and felt as if she would pass out.  She did not have chest pain or palpitations.  Daughters state that they have noticed pt has appeared very dyspneic with minimal exertion for for the past several months, but pt typically does not complain to them about these symptoms.  Pt reports chronic vertigo with nausea and occasional vomiting, but no change in these symptoms recently.  No recent cold symptoms, cough, abd pain, or diarrhea.      Pt previously was living in [place] and was admitted in a hospital there last February for CVA.  At that time, EMS was called, because pt did not answer her door for 2 days, and was found on the floor confused.  Daughters report that while admitted, she was diagnosed with a CVA.  She had workup with cath during that admission, but unsure what it showed.  She also had ILR placed, but has not used the recorder remote.      In the ED, pt was given 500cc NS.   (31 May 2024 22:03)      PAST MEDICAL & SURGICAL HISTORY:  History of CVA (cerebrovascular accident)        MEDICATIONS  (STANDING):  aspirin enteric coated 81 milliGRAM(s) Oral daily  atorvastatin 10 milliGRAM(s) Oral at bedtime  clopidogrel Tablet 75 milliGRAM(s) Oral daily  enoxaparin Injectable 40 milliGRAM(s) SubCutaneous every 24 hours  famotidine    Tablet 20 milliGRAM(s) Oral daily  ferrous    sulfate 325 milliGRAM(s) Oral daily  multivitamin 1 Tablet(s) Oral daily  sodium chloride 0.9%. 1000 milliLiter(s) (75 mL/Hr) IV Continuous <Continuous>    MEDICATIONS  (PRN):  melatonin 3 milliGRAM(s) Oral at bedtime PRN Insomnia      FAMILY HISTORY:  FH: CAD (coronary artery disease) (Father)        SOCIAL HISTORY:    LIVING SITUATION:  CIGARETTES: Denied  ALCOHOL: denied   ILLICIT DRUG USES: denied      Vital Signs Last 24 Hrs  T(C): 36.6 (03 Jun 2024 09:17), Max: 37 (02 Jun 2024 13:30)  T(F): 97.9 (03 Jun 2024 09:17), Max: 98.6 (02 Jun 2024 13:30)  HR: 53 (03 Jun 2024 11:51) (53 - 60)  BP: 124/76 (03 Jun 2024 11:51) (99/75 - 130/67)  BP(mean): --  RR: 18 (03 Jun 2024 11:51) (16 - 18)  SpO2: 100% (03 Jun 2024 11:51) (98% - 100%)    Parameters below as of 03 Jun 2024 11:51  Patient On (Oxygen Delivery Method): room air        PHYSICAL EXAM:  GENERAL: Well appearing, speaking in full sentence, in NAD  HEART: S1S2 RRR; No murmurs, rubs, or gallops appreciated . +ILR  PULMONARY:CTABL, normal respiratory effort.  No rales, wheezing, or rhonchi appreciated bilaterally  ABDOMEN: Bowel sounds present, soft, NDNT  EXTREMITIES:  Warm, well -perfused, no pedal edema, distal pulses present    INTERPRETATION OF TELEMETRY: sinus bradycardia 50s    ECG: NSR @ 64bpm, QRS duration 90ms, QTC 486ms, no ST changes    I&O's Detail      LABS:                        11.4   4.58  )-----------( 195      ( 03 Jun 2024 06:45 )             33.0     06-03    138  |  105  |  18  ----------------------------<  86  4.0   |  23  |  0.74    Ca    8.8      03 Jun 2024 06:45  Phos  3.5     06-03  Mg     1.70     06-03            Urinalysis Basic - ( 03 Jun 2024 06:45 )    Color: x / Appearance: x / SG: x / pH: x  Gluc: 86 mg/dL / Ketone: x  / Bili: x / Urobili: x   Blood: x / Protein: x / Nitrite: x   Leuk Esterase: x / RBC: x / WBC x   Sq Epi: x / Non Sq Epi: x / Bacteria: x      BNP  I&O's Detail    Daily     Daily     RADIOLOGY & ADDITIONAL STUDIES:        
CHIEF COMPLAINT: SOB     pulmonary consultation : SOB , presyncope , bilateral multiple pulmonary nodules   79 y/o F with pmh of CVA (Feb 2023) and vertigo p/w dyspnea and near syncope.  Pt reports she was at PT today and working on one of the machines there when she became very dyspneic, and felt as if she would pass out.  She did not have chest pain or palpitations.  Daughters state that they have noticed pt has appeared very dyspneic with minimal exertion for for the past several months, but pt typically does not complain to them about these symptoms.  Pt reports chronic vertigo with nausea and occasional vomiting, but no change in these symptoms recently.  No recent cold symptoms, cough, abd pain, or diarrhea.      Pt previously was living in [place] and was admitted in a hospital there last February for CVA.  At that time, EMS was called, because pt did not answer her door for 2 days, and was found on the floor confused.  Daughters report that while admitted, she was diagnosed with a CVA.  She had workup with cath during that admission, but unsure what it showed.  She also had ILR placed, but has not used the recorder remote.  , patient has CT angiogram negative for PE , but it shows  multiple pulmonary nodules , denies weight loss coughing or hemoptysis , no repotted aspiration     In the ED, pt was given 500cc NS.   (31 May 2024 22:03)    PAST MEDICAL & SURGICAL HISTORY:  History of CVA (cerebrovascular accident)          FAMILY HISTORY:  FH: CAD (coronary artery disease) (Father)    Review of Systems:  Review of Systems: Review of Systems:   CONSTITUTIONAL: No fever or chills  EYES: No eye pain, visual disturbances, or discharge  ENMT:  No difficulty hearing, no throat pain  NECK: No pain or stiffness  RESPIRATORY: No cough, + shortness of breath  CARDIOVASCULAR: No chest pain, palpitations, or leg swelling  GASTROINTESTINAL: No abdominal pain, nausea, vomiting or diarrhea  GENITOURINARY: No dysuria, or hematuria  NEUROLOGICAL: vertigo, near syncope  SKIN: No rashes, or lesions   LYMPH NODES: No enlarged glands  ENDOCRINE: No heat or cold intolerance  MUSCULOSKELETAL: No joint pain or swelling  PSYCHIATRIC: No depression or anxiety  HEME/LYMPH: No easy bruising, or bleeding  ALLERGY AND IMMUNOLOGIC: No hives or eczema        OBJECTIVE:  Vital Signs Last 24 Hrs  T(C): 36.8 (01 Jun 2024 05:15), Max: 36.8 (01 Jun 2024 05:15)  T(F): 98.2 (01 Jun 2024 05:15), Max: 98.2 (01 Jun 2024 05:15)  HR: 54 (01 Jun 2024 05:15) (46 - 63)  BP: 156/68 (01 Jun 2024 05:15) (142/86 - 161/73)  BP(mean): --  RR: 15 (01 Jun 2024 05:15) (15 - 25)  SpO2: 97% (01 Jun 2024 05:15) (97% - 100%)    Parameters below as of 01 Jun 2024 05:15  Patient On (Oxygen Delivery Method): room air          HOSPITAL MEDICATIONS:   aspirin enteric coated 81 milliGRAM(s) Oral daily  clopidogrel Tablet 75 milliGRAM(s) Oral daily  famotidine    Tablet 20 milliGRAM(s) Oral daily  ferrous    sulfate 325 milliGRAM(s) Oral daily  melatonin 3 milliGRAM(s) Oral at bedtime PRN  multivitamin 1 Tablet(s) Oral daily    penicillin (Other)        PHYSICAL EXAM:Daily Height in cm: 172.72 (31 May 2024 15:50)  elderly female in no acute distress   Daily   HEENT:     + NCAT  + EOMI  - Conjuctival edema   - Icterus   - Thrush   - ETT  - NGT/OGT  Neck:         + FROM    + JVD     - Nodes     - Masses    + Mid-line trachea   - Tracheostomy  Chest:            normal findings   ILD   Lungs:          + CTA   - Rhonchi    - Rales    - Wheezing     - Decreased BS   - Dullness R L  Cardiac:       + S1 + S2    + RRR   - Irregular   - S3  - S4    - Murmurs   - Rub   - Hamman’s sign   Abdomen:    + BS     + Soft    + Non-tender     - Distended    - Organomegaly  - PEG  Extremities:   - Cyanosis U/L   - Clubbing  U/L  - LE/UE Edema   + Capillary refill    + Pulses   Neuro:        + Awake   +  Alert   - Confused   - Lethargic   - Sedated   - Generalized Weakness  Skin:        - Rashes    - Erythema   + Normal incisions   + IV sites intact  - Sacral decubitus    LABS:                        10.7   4.15  )-----------( 182      ( 01 Jun 2024 06:25 )             30.7     06-01    143  |  109<H>  |  11  ----------------------------<  81  3.9   |  23  |  0.75    Ca    8.9      01 Jun 2024 06:25  Phos  4.4     06-01  Mg     1.80     06-01    TPro  7.4  /  Alb  4.2  /  TBili  0.3  /  DBili  x   /  AST  28  /  ALT  20  /  AlkPhos  74  05-31    PT/INR - ( 31 May 2024 17:09 )   PT: 11.6 sec;   INR: 1.04 ratio         PTT - ( 31 May 2024 17:09 )  PTT:30.1 sec  LIVER FUNCTIONS - ( 31 May 2024 17:09 )  Alb: 4.2 g/dL / Pro: 7.4 g/dL / ALK PHOS: 74 U/L / ALT: 20 U/L / AST: 28 U/L / GGT: x            Venous Blood Gas:  05-31 @ 18:52  7.36/44/20/25/23.5  VBG Lactate: 1.2  Venous Blood Gas:  05-31 @ 17:09  7.47/33/<20/24/24.6  VBG Lactate: 2.3    CARDIAC MARKERS ( 31 May 2024 17:09 )  x     / x     / 97 U/L / x     / 1.3 ng/mL      LIVER FUNCTIONS - ( 31 May 2024 17:09 )  Alb: 4.2 g/dL / Pro: 7.4 g/dL / ALK PHOS: 74 U/L / ALT: 20 U/L / AST: 28 U/L / GGT: x             RADIOLOGY:  X Reviewed and interpreted by me Bilateral multiple lung nodules   
CARDIOLOGY CONSULT NOTE - DR. MARTE        Date of Service: 06-01-24 @ 11:32      HPI:  77 y/o F with pmh of CVA (Feb 2023) and vertigo p/w dyspnea and near syncope.  Pt reports she was at PT today and working on one of the machines there when she became very dyspneic, and felt as if she would pass out.  She did not have chest pain or palpitations.  Daughters state that they have noticed pt has appeared very dyspneic with minimal exertion for for the past several months, but pt typically does not complain to them about these symptoms.  Pt reports chronic vertigo with nausea and occasional vomiting, but no change in these symptoms recently.  No recent cold symptoms, cough, abd pain, or diarrhea.      Pt previously was living in [place] and was admitted in a hospital there last February for CVA.  At that time, EMS was called, because pt did not answer her door for 2 days, and was found on the floor confused.  Daughters report that while admitted, she was diagnosed with a CVA.  She had workup with cath during that admission, but unsure what it showed.  She also had ILR placed, but has not used the recorder remote.      In the ED, pt was given 500cc NS.   (31 May 2024 22:03)    resolved sx  no chest pain, dyspnea  no dizziness      PAST MEDICAL & SURGICAL HISTORY:  History of CVA (cerebrovascular accident)            PREVIOUS DIAGNOSTIC TESTING:    [ ] Echocardiogram:  [ ]  Catheterization:  [ ] Stress Test:  	    MEDICATIONS:    Home Medications:  aspirin 81 mg oral tablet: 1 tab(s) orally once a day (31 May 2024 22:08)  famotidine 40 mg oral tablet: 1 tab(s) orally once a day (31 May 2024 22:08)  ferrous sulfate 324 mg (65 mg elemental iron) oral tablet: 1 tab(s) orally once a day (31 May 2024 22:09)  melatonin 3 mg oral tablet: 2 tab(s) orally once a day (at bedtime) (31 May 2024 22:09)  Multiple Vitamins oral tablet: 1 tab(s) orally once a day (31 May 2024 22:09)  Plavix 75 mg oral tablet: 1 tab(s) orally once a day (31 May 2024 22:08)      MEDICATIONS  (STANDING):  aspirin enteric coated 81 milliGRAM(s) Oral daily  clopidogrel Tablet 75 milliGRAM(s) Oral daily  famotidine    Tablet 20 milliGRAM(s) Oral daily  ferrous    sulfate 325 milliGRAM(s) Oral daily  multivitamin 1 Tablet(s) Oral daily      FAMILY HISTORY:  FH: CAD (coronary artery disease) (Father)        SOCIAL HISTORY:    [x ] Non-smoker  [ ] Smoker  [ ] Alcohol    Allergies    penicillin (Other)    Intolerances    	    REVIEW OF SYSTEMS:  CONSTITUTIONAL: No fever, weight loss, or fatigue  EYES: No eye pain, visual disturbances, or discharge  ENMT:  No difficulty hearing, tinnitus, vertigo; No sinus or throat pain  NECK: No pain or stiffness  RESPIRATORY: No cough, wheezing, chills or hemoptysis; + Shortness of Breath  CARDIOVASCULAR: as HPI  GASTROINTESTINAL: No abdominal or epigastric pain. No nausea, vomiting, or hematemesis; No diarrhea or constipation. No melena or hematochezia.  GENITOURINARY: No dysuria, frequency, hematuria, or incontinence  NEUROLOGICAL: No headaches, memory loss, loss of strength, numbness, or tremors  SKIN: No itching, burning, rashes, or lesions   	  [ ] All others negative	  [ ] Unable to obtain    PHYSICAL EXAM:    T(C): 36.8 (06-01-24 @ 05:15), Max: 36.8 (06-01-24 @ 05:15)  HR: 54 (06-01-24 @ 05:15) (46 - 63)  BP: 156/68 (06-01-24 @ 05:15) (142/86 - 161/73)  RR: 15 (06-01-24 @ 05:15) (15 - 25)  SpO2: 97% (06-01-24 @ 05:15) (97% - 100%)  Wt(kg): --  I&O's Summary    Daily Height in cm: 172.72 (31 May 2024 15:50)    Daily     Appearance: Normal	  Psychiatry: A & O x 3, Mood & affect appropriate  HEENT:   Normal oral mucosa, PERRL, EOMI	  Lymphatic: No lymphadenopathy  Cardiovascular: Normal S1 S2,RRR, No JVD, No murmurs  Respiratory: Lungs clear to auscultation	  Gastrointestinal:  Soft, Non-tender, + BS	  Skin: No rashes, No ecchymoses, No cyanosis	  Neurologic: Non-focal  Extremities: Normal range of motion, No clubbing, cyanosis or edema  Vascular: Peripheral pulses palpable 2+ bilaterally    TELEMETRY: 	    ECG:  	sb  no acute ischemic abnl   RADIOLOGY:  OTHER: 	  	  LABS:	 	    CARDIAC MARKERS:        proBNP:     Lipid Profile:   HgA1c:   TSH:                           10.7   4.15  )-----------( 182      ( 01 Jun 2024 06:25 )             30.7     06-01    143  |  109<H>  |  11  ----------------------------<  81  3.9   |  23  |  0.75    Ca    8.9      01 Jun 2024 06:25  Phos  4.4     06-01  Mg     1.80     06-01    TPro  7.4  /  Alb  4.2  /  TBili  0.3  /  DBili  x   /  AST  28  /  ALT  20  /  AlkPhos  74  05-31    PT/INR - ( 31 May 2024 17:09 )   PT: 11.6 sec;   INR: 1.04 ratio         PTT - ( 31 May 2024 17:09 )  PTT:30.1 sec    Creatinine: 0.75 mg/dL (06-01-24 @ 06:25)  Creatinine: 0.73 mg/dL (05-31-24 @ 17:09)        ASSESSMENT/PLAN: 	              
Medicine  Consult Note - Dr. Sebastian    CC: ptn admitted 5/31 for dyspnea and dizziness    Date of Service: 06-01-24 @ 12:21    HPI:  77 y/o F with pmh of CVA (Feb 2023) and vertigo p/w dyspnea and near syncope.  Pt reports she was at PT the day of admission and while working on one of the machines  she became very dyspneic, and felt as if she would pass out.  She did not have chest pain or palpitations.  Daughters state that they had noticed pt has appeared very dyspneic lately with minimal exertion for for the past several months, but pt typically does not complain to them about these symptoms.  Pt reports chronic vertigo with nausea and occasional vomiting, but no change in these symptoms recently.  No recent cold symptoms, cough, abd pain, or diarrhea.      Pt previously was living in [place] and was admitted in a hospital there last February for CVA.  At that time, EMS was called, because pt did not answer her door for 2 days, and was found on the floor confused.  Daughters report that while admitted, she was diagnosed with a CVA.  She had a workup with a cath during that admission, but unsure what it showed.  She also had ILR placed, but has not used the recorder remote.      In the ED, pt was given 500cc NS  since admission symptoms have resolved   denies chest pain, dyspnea, dizziness at rest  seen by cardiology      PAST MEDICAL & SURGICAL HISTORY:  History of CVA (cerebrovascular accident)    MEDICATIONS:    Home Medications:  aspirin 81 mg oral tablet: 1 tab(s) orally once a day (31 May 2024 22:08)  famotidine 40 mg oral tablet: 1 tab(s) orally once a day (31 May 2024 22:08)  ferrous sulfate 324 mg (65 mg elemental iron) oral tablet: 1 tab(s) orally once a day (31 May 2024 22:09)  melatonin 3 mg oral tablet: 2 tab(s) orally once a day (at bedtime) (31 May 2024 22:09)  Multiple Vitamins oral tablet: 1 tab(s) orally once a day (31 May 2024 22:09)  Plavix 75 mg oral tablet: 1 tab(s) orally once a day (31 May 2024 22:08)      MEDICATIONS  (STANDING):  aspirin enteric coated 81 milliGRAM(s) Oral daily  clopidogrel Tablet 75 milliGRAM(s) Oral daily  famotidine    Tablet 20 milliGRAM(s) Oral daily  ferrous    sulfate 325 milliGRAM(s) Oral daily  multivitamin 1 Tablet(s) Oral daily      FAMILY HISTORY:  FH: CAD (coronary artery disease) (Father)        SOCIAL HISTORY:  Non-smoker      Allergies: penicillin (Other)    REVIEW OF SYSTEMS:  CONSTITUTIONAL: No fever, weight loss, or fatigue  EYES: No eye pain, visual disturbances, or discharge  ENMT:  No difficulty hearing, tinnitus, vertigo; No sinus or throat pain  NECK: No pain or stiffness  RESPIRATORY: No cough, wheezing, chills or hemoptysis; + Shortness of Breath  CARDIOVASCULAR: as HPI  GASTROINTESTINAL: No abdominal or epigastric pain. No nausea, vomiting, or hematemesis; No diarrhea or constipation. No melena or hematochezia.  GENITOURINARY: No dysuria, frequency, hematuria, or incontinence  NEUROLOGICAL: No headaches, memory loss, loss of strength, numbness, or tremors  SKIN: No itching, burning, rashes, or lesions   	   All others negative	      PHYSICAL EXAM:    T(C): 36.8 (06-01-24 @ 05:15), Max: 36.8 (06-01-24 @ 05:15)  HR: 54 (06-01-24 @ 05:15) (46 - 63)  BP: 156/68 (06-01-24 @ 05:15) (142/86 - 161/73)  RR: 15 (06-01-24 @ 05:15) (15 - 25)  SpO2: 97% (06-01-24 @ 05:15) (97% - 100%)      Daily Height in cm: 172.72 (31 May 2024 15:50)      Appearance: Normal	  Psychiatry: A & O x 3, Mood & affect appropriate  HEENT:   Normal oral mucosa, PERRL, EOMI	  Lymphatic: No lymphadenopathy  Cardiovascular: Normal S1 S2,RRR, No JVD, No murmurs  Respiratory: Lungs clear to auscultation	  Gastrointestinal:  Soft, Non-tender, + BS	  Skin: No rashes, No ecchymoses, No cyanosis	  Neurologic: Non-focal  Extremities: Normal range of motion, No clubbing, cyanosis or edema  Vascular: Peripheral pulses palpable 2+ bilaterally    TELEMETRY: no events	    ECG:  	sinus chalo  no acute ischemic abnl                         10.7   4.15  )-----------( 182      ( 01 Jun 2024 06:25 )             30.7     06-01    143  |  109<H>  |  11  ----------------------------<  81  3.9   |  23  |  0.75    Ca    8.9      01 Jun 2024 06:25  Phos  4.4     06-01  Mg     1.80     06-01    TPro  7.4  /  Alb  4.2  /  TBili  0.3  /  DBili  x   /  AST  28  /  ALT  20  /  AlkPhos  74  05-31    PT/INR - ( 31 May 2024 17:09 )   PT: 11.6 sec;   INR: 1.04 ratio         PTT - ( 31 May 2024 17:09 )  PTT:30.1 sec    Creatinine: 0.75 mg/dL (06-01-24 @ 06:25)  Creatinine: 0.73 mg/dL (05-31-24 @ 17:09)

## 2024-06-04 ENCOUNTER — TRANSCRIPTION ENCOUNTER (OUTPATIENT)
Age: 79
End: 2024-06-04

## 2024-06-04 LAB
ANION GAP SERPL CALC-SCNC: 12 MMOL/L — SIGNIFICANT CHANGE UP (ref 7–14)
BUN SERPL-MCNC: 20 MG/DL — SIGNIFICANT CHANGE UP (ref 7–23)
CALCIUM SERPL-MCNC: 9.1 MG/DL — SIGNIFICANT CHANGE UP (ref 8.4–10.5)
CHLORIDE SERPL-SCNC: 104 MMOL/L — SIGNIFICANT CHANGE UP (ref 98–107)
CO2 SERPL-SCNC: 22 MMOL/L — SIGNIFICANT CHANGE UP (ref 22–31)
CREAT SERPL-MCNC: 0.77 MG/DL — SIGNIFICANT CHANGE UP (ref 0.5–1.3)
EGFR: 78 ML/MIN/1.73M2 — SIGNIFICANT CHANGE UP
GLUCOSE SERPL-MCNC: 79 MG/DL — SIGNIFICANT CHANGE UP (ref 70–99)
HCT VFR BLD CALC: 33.9 % — LOW (ref 34.5–45)
HGB BLD-MCNC: 11.3 G/DL — LOW (ref 11.5–15.5)
MAGNESIUM SERPL-MCNC: 1.7 MG/DL — SIGNIFICANT CHANGE UP (ref 1.6–2.6)
MCHC RBC-ENTMCNC: 32.8 PG — SIGNIFICANT CHANGE UP (ref 27–34)
MCHC RBC-ENTMCNC: 33.3 GM/DL — SIGNIFICANT CHANGE UP (ref 32–36)
MCV RBC AUTO: 98.3 FL — SIGNIFICANT CHANGE UP (ref 80–100)
NRBC # BLD: 0 /100 WBCS — SIGNIFICANT CHANGE UP (ref 0–0)
NRBC # FLD: 0 K/UL — SIGNIFICANT CHANGE UP (ref 0–0)
PHOSPHATE SERPL-MCNC: 3.2 MG/DL — SIGNIFICANT CHANGE UP (ref 2.5–4.5)
PLATELET # BLD AUTO: 198 K/UL — SIGNIFICANT CHANGE UP (ref 150–400)
POTASSIUM SERPL-MCNC: 4.2 MMOL/L — SIGNIFICANT CHANGE UP (ref 3.5–5.3)
POTASSIUM SERPL-SCNC: 4.2 MMOL/L — SIGNIFICANT CHANGE UP (ref 3.5–5.3)
RBC # BLD: 3.45 M/UL — LOW (ref 3.8–5.2)
RBC # FLD: 14.4 % — SIGNIFICANT CHANGE UP (ref 10.3–14.5)
SODIUM SERPL-SCNC: 138 MMOL/L — SIGNIFICANT CHANGE UP (ref 135–145)
WBC # BLD: 5.33 K/UL — SIGNIFICANT CHANGE UP (ref 3.8–10.5)
WBC # FLD AUTO: 5.33 K/UL — SIGNIFICANT CHANGE UP (ref 3.8–10.5)

## 2024-06-04 RX ORDER — SODIUM CHLORIDE 9 MG/ML
1000 INJECTION INTRAMUSCULAR; INTRAVENOUS; SUBCUTANEOUS
Refills: 0 | Status: DISCONTINUED | OUTPATIENT
Start: 2024-06-04 | End: 2024-06-06

## 2024-06-04 RX ADMIN — CLOPIDOGREL BISULFATE 75 MILLIGRAM(S): 75 TABLET, FILM COATED ORAL at 11:23

## 2024-06-04 RX ADMIN — ATORVASTATIN CALCIUM 10 MILLIGRAM(S): 80 TABLET, FILM COATED ORAL at 21:57

## 2024-06-04 RX ADMIN — FAMOTIDINE 20 MILLIGRAM(S): 10 INJECTION INTRAVENOUS at 11:24

## 2024-06-04 RX ADMIN — SODIUM CHLORIDE 75 MILLILITER(S): 9 INJECTION INTRAMUSCULAR; INTRAVENOUS; SUBCUTANEOUS at 12:47

## 2024-06-04 RX ADMIN — Medication 325 MILLIGRAM(S): at 11:24

## 2024-06-04 RX ADMIN — Medication 3 MILLIGRAM(S): at 21:57

## 2024-06-04 RX ADMIN — Medication 1 TABLET(S): at 11:24

## 2024-06-04 RX ADMIN — Medication 81 MILLIGRAM(S): at 11:24

## 2024-06-04 NOTE — DISCHARGE NOTE PROVIDER - HOSPITAL COURSE
HPI:  77 y/o F with pmh of CVA (Feb 2023) and vertigo p/w dyspnea and near syncope.  Pt reports she was at PT today and working on one of the machines there when she became very dyspneic, and felt as if she would pass out.  She did not have chest pain or palpitations.  Daughters state that they have noticed pt has appeared very dyspneic with minimal exertion for for the past several months, but pt typically does not complain to them about these symptoms.  Pt reports chronic vertigo with nausea and occasional vomiting, but no change in these symptoms recently.  No recent cold symptoms, cough, abd pain, or diarrhea.      Pt previously was living in [place] and was admitted in a hospital there last February for CVA.  At that time, EMS was called, because pt did not answer her door for 2 days, and was found on the floor confused.  Daughters report that while admitted, she was diagnosed with a CVA.  She had workup with cath during that admission, but unsure what it showed.  She also had ILR placed, but has not used the recorder remote.      In the ED, pt was given 500cc NS.   (31 May 2024 22:03)      HOSPITAL COURSE:  Vital Signs Last 24 Hrs  T(C): 36.6 (06 Jun 2024 06:00), Max: 36.7 (05 Jun 2024 11:00)  T(F): 97.8 (06 Jun 2024 06:00), Max: 98 (05 Jun 2024 11:00)  HR: 52 (06 Jun 2024 06:00) (52 - 59)  BP: 126/61 (06 Jun 2024 06:00) (125/68 - 136/67)  BP(mean): --  RR: 18 (06 Jun 2024 06:00) (16 - 18)  SpO2: 100% (06 Jun 2024 06:00) (100% - 100%)    Parameters below as of 05 Jun 2024 17:47  Patient On (Oxygen Delivery Method): room air    Near Syncope  -dizziness upon standing, symptomatic while checking, repeat 6/4 improved  -continue IVF 6/4, repeat orthostatics *   - ILR interrogated, device working appropriately, no pauses/bradycardia/AT/AF based on settings    SOB, now on RA    -CT angio - No pulmonary embolus.  -CXR - lungs clear    ****STARS Patient******  - Token created for appt with PCP in [place]    Dispo: medically cleared for [place]- daughter can pick-up 6/6         Patient currently denies chest pain, sob, palpitations, dizziness or lightheadedness. Case d/w attending (Dr. Navarro): Pt to be dc'd home and F/U with her PCP as instructed.>>> <<< HPI:  77 y/o F with pmh of CVA (Feb 2023) and vertigo p/w dyspnea and near syncope.  Pt reports she was at PT today and working on one of the machines there when she became very dyspneic, and felt as if she would pass out.  She did not have chest pain or palpitations.  Daughters state that they have noticed pt has appeared very dyspneic with minimal exertion for for the past several months, but pt typically does not complain to them about these symptoms.  Pt reports chronic vertigo with nausea and occasional vomiting, but no change in these symptoms recently.  No recent cold symptoms, cough, abd pain, or diarrhea.      Pt previously was living in [place] and was admitted in a hospital there last February for CVA.  At that time, EMS was called, because pt did not answer her door for 2 days, and was found on the floor confused.  Daughters report that while admitted, she was diagnosed with a CVA.  She had workup with cath during that admission, but unsure what it showed.  She also had ILR placed, but has not used the recorder remote.      In the ED, pt was given 500cc NS.   (31 May 2024 22:03)      HOSPITAL COURSE:  Vital Signs Last 24 Hrs  T(C): 36.6 (06 Jun 2024 06:00), Max: 36.7 (05 Jun 2024 11:00)  T(F): 97.8 (06 Jun 2024 06:00), Max: 98 (05 Jun 2024 11:00)  HR: 52 (06 Jun 2024 06:00) (52 - 59)  BP: 126/61 (06 Jun 2024 06:00) (125/68 - 136/67)  BP(mean): --  RR: 18 (06 Jun 2024 06:00) (16 - 18)  SpO2: 100% (06 Jun 2024 06:00) (100% - 100%)    Parameters below as of 05 Jun 2024 17:47  Patient On (Oxygen Delivery Method): room air    -#Bradycardia/syncope  -tele -   -tte - EF 67%  - fu ep eval  - please call today if wasn't called yesterday___  -s/p ILR interogation for near syncope - No arrhythmia events noted corresponding to the reported symptoms.  -Trop 10 --> 10   - cards consulted - recs appreciated     #Dyspnea.   · possibly related to bradycardia/ chronotropic incompetence  - TTE and EP as above.  - pulm cs-Pulm eval  SOB and presyncope   Bilateral multiple lung nodules   Chronic vertigo   orthostatic hypotension   H/O CVA   6/6: SOB, now on RA    -CT angio - No pulmonary embolus.  -CXR - lungs clear    #History of CVA (cerebrovascular accident).   ·  Plan: - continue ASA and Plavix.    Dispo: medically cleared for DC- daughter can pick-up 6/6         Patient currently denies chest pain, sob, palpitations, dizziness or lightheadedness. Case d/w attending (Dr. Navarro): Pt to be dc'd home and F/U with her PCP as instructed.>>> <<<

## 2024-06-04 NOTE — DISCHARGE NOTE PROVIDER - NSDCMRMEDTOKEN_GEN_ALL_CORE_FT
aspirin 81 mg oral tablet: 1 tab(s) orally once a day  famotidine 40 mg oral tablet: 1 tab(s) orally once a day  ferrous sulfate 324 mg (65 mg elemental iron) oral tablet: 1 tab(s) orally once a day  melatonin 3 mg oral tablet: 2 tab(s) orally once a day (at bedtime)  Multiple Vitamins oral tablet: 1 tab(s) orally once a day  Plavix 75 mg oral tablet: 1 tab(s) orally once a day   aspirin 81 mg oral tablet: 1 tab(s) orally once a day  atorvastatin 10 mg oral tablet: 1 tab(s) orally once a day (at bedtime)  famotidine 40 mg oral tablet: 1 tab(s) orally once a day  ferrous sulfate 324 mg (65 mg elemental iron) oral tablet: 1 tab(s) orally once a day  melatonin 3 mg oral tablet: 2 tab(s) orally once a day (at bedtime)  Multiple Vitamins oral tablet: 1 tab(s) orally once a day  Plavix 75 mg oral tablet: 1 tab(s) orally once a day

## 2024-06-04 NOTE — DISCHARGE NOTE PROVIDER - NSDCFUADDAPPT_GEN_ALL_CORE_FT
APPTS ARE READY TO BE MADE: [ ] YES    Best Family or Patient Contact (if needed):    Additional Information about above appointments (if needed):    1: Dr Carlos A Blunt PCP in 1 week from discharge (likely 6/5)  2:   3:     Other comments or requests:    APPTS ARE READY TO BE MADE: [X] YES    Best Family or Patient Contact (if needed):    Additional Information about above appointments (if needed):    1: Dr Carlos A Blunt PCP in 1 week fr m discharge (likely 6/5)  2:   3:     Other comments or requests:    APPTS ARE READY TO BE MADE: [X] YES    Best Family or Patient Contact (if needed):    Additional Information about above appointments (if needed):    1: Dr Carlos A Blunt PCP in 1 week fr m discharge (likely 6/5)  2:   3:     Other comments or requests:     Appointment was scheduled by our team on the patient's behalf through the provider's office with Dr. Blunt for 6/24/2024 at 3:45pm, [address], [address], [place] [address].

## 2024-06-04 NOTE — DISCHARGE NOTE PROVIDER - REASON FOR ADMISSION
Dyspnea, dizziness Pt with 2 days of no appetite, abd pain, N/V, subjective fever. Pt calm, well appearing and in no distress.

## 2024-06-04 NOTE — DISCHARGE NOTE PROVIDER - NSDCCPCAREPLAN_GEN_ALL_CORE_FT
PRINCIPAL DISCHARGE DIAGNOSIS  Diagnosis: Dyspnea  Assessment and Plan of Treatment: You were having shortness of breath, you had a CT scan of your chest which showed multiple lung nodules; recommend a 6 month follow-up chest CT, please follow-up with your PCP for further management      SECONDARY DISCHARGE DIAGNOSES  Diagnosis: Bradycardia  Assessment and Plan of Treatment: Your heart rate was noted to be low, we checked your loop recorder which showed no cardiac events attributing to the cause of your syncope    Diagnosis: Near syncope  Assessment and Plan of Treatment: You were seen by cardiology and neurology your work-up was unremarkable for the cause of your lightheadedness. Your blood pressure was dropping while changing positions, also known as orthostatic hypotension, this has resolved with IV hydration. Please continue to stay hydrated and change positions slowly.     PRINCIPAL DISCHARGE DIAGNOSIS  Diagnosis: Dyspnea  Assessment and Plan of Treatment: You were having shortness of breath, you had a CT scan of your chest which showed multiple lung nodules; recommend a 6 month follow-up chest CT, please follow-up with your PCP for further management      SECONDARY DISCHARGE DIAGNOSES  Diagnosis: Near syncope  Assessment and Plan of Treatment: You were seen by cardiology and neurology your work-up was unremarkable for the cause of your lightheadedness. Your blood pressure was dropping while changing positions, also known as orthostatic hypotension, this has resolved with IV hydration. Please continue to stay hydrated and change positions slowly.    Diagnosis: Bradycardia  Assessment and Plan of Treatment: Your heart rate was noted to be low, we checked your loop recorder which showed no cardiac events attributing to the cause of your syncope    Diagnosis: HLD (hyperlipidemia)  Assessment and Plan of Treatment: Take medications as instructed on discharge  Follow up with your PCP as outpatient    Diagnosis: History of CVA (cerebrovascular accident)  Assessment and Plan of Treatment: Take medications as instructed on discharge  Follow up with your PCP as outpatient

## 2024-06-04 NOTE — DISCHARGE NOTE PROVIDER - NPI NUMBER (FOR S[employer] USE ONLY) :
19 years old male past medical history of bipolar and developmental delay came to emergency room for alleged aggressive and violent behavior as per step father. Evaluated by Psych; Step Father left stated pt needs placement refusing to take child home.     # Bipolar disorder and likely developmental disorder- stable  - continue current meds (on depakote, risperidone, and lithium)   - pt has been stable without aggressive behaviour in the hospital  - father states he cant take care of pt    # Depressed mood, negative thoughts  - cont w lithium and valproic acid, levels wnl, c/w risperiDONE     - psych consult appreciated    # Obesity: Likely multifactorial: excess calories and Psych medication induced     DVT px - pt ambulatory     DISPO: pending placement     [[id number]]

## 2024-06-04 NOTE — DISCHARGE NOTE PROVIDER - CARE PROVIDER_API CALL
Carlos A Blunt J  Gastroenterology  [address], [address]  [place] [address]  Phone: [phone]  Fax: [phone]  Follow Up Time:

## 2024-06-05 RX ADMIN — CLOPIDOGREL BISULFATE 75 MILLIGRAM(S): 75 TABLET, FILM COATED ORAL at 15:59

## 2024-06-05 RX ADMIN — ATORVASTATIN CALCIUM 10 MILLIGRAM(S): 80 TABLET, FILM COATED ORAL at 21:13

## 2024-06-05 RX ADMIN — Medication 325 MILLIGRAM(S): at 15:59

## 2024-06-05 RX ADMIN — Medication 1 TABLET(S): at 15:59

## 2024-06-05 RX ADMIN — Medication 81 MILLIGRAM(S): at 15:59

## 2024-06-05 RX ADMIN — FAMOTIDINE 20 MILLIGRAM(S): 10 INJECTION INTRAVENOUS at 15:59

## 2024-06-06 ENCOUNTER — TRANSCRIPTION ENCOUNTER (OUTPATIENT)
Age: 79
End: 2024-06-06

## 2024-06-06 VITALS
SYSTOLIC BLOOD PRESSURE: 125 MMHG | RESPIRATION RATE: 18 BRPM | HEART RATE: 70 BPM | DIASTOLIC BLOOD PRESSURE: 76 MMHG | TEMPERATURE: 98 F | OXYGEN SATURATION: 100 %

## 2024-06-06 LAB — GLUCOSE BLDC GLUCOMTR-MCNC: 120 MG/DL — HIGH (ref 70–99)

## 2024-06-06 RX ORDER — ATORVASTATIN CALCIUM 80 MG/1
1 TABLET, FILM COATED ORAL
Qty: 30 | Refills: 0
Start: 2024-06-06 | End: 2024-07-05

## 2024-06-06 RX ADMIN — Medication 81 MILLIGRAM(S): at 11:05

## 2024-06-06 RX ADMIN — Medication 325 MILLIGRAM(S): at 11:05

## 2024-06-06 RX ADMIN — CLOPIDOGREL BISULFATE 75 MILLIGRAM(S): 75 TABLET, FILM COATED ORAL at 11:05

## 2024-06-06 RX ADMIN — Medication 1 TABLET(S): at 11:05

## 2024-06-06 RX ADMIN — FAMOTIDINE 20 MILLIGRAM(S): 10 INJECTION INTRAVENOUS at 11:06

## 2024-06-06 NOTE — PROGRESS NOTE ADULT - SUBJECTIVE AND OBJECTIVE BOX
DONNA VU  MRN#: [id number]  Subjective: pulmonary consultation : SOB , presyncope , bilateral multiple pulmonary nodules date od service 6/5/2024  77 y/o F with pmh of CVA (Feb 2023) and vertigo p/w dyspnea and near syncope.  Pt reports she was at PT today and working on one of the machines there when she became very dyspneic, and felt as if she would pass out.  She did not have chest pain or palpitations.  Daughters state that they have noticed pt has appeared very dyspneic with minimal exertion for for the past several months, but pt typically does not complain to them about these symptoms.  Pt reports chronic vertigo with nausea and occasional vomiting, but no change in these symptoms recently.  No recent cold symptoms, cough, abd pain, or diarrhea.      Pt previously was living in [place] and was admitted in a hospital there last February for CVA.  At that time, EMS was called, because pt did not answer her door for 2 days, and was found on the floor confused.  Daughters report that while admitted, she was diagnosed with a CVA.  She had workup with cath during that admission, but unsure what it showed.  She also had ILR placed, but has not used the recorder remote.  ,the  patient has CT angiogram negative for PE , but it shows  multiple pulmonary nodules , denies weight loss coughing or hemoptysis , no repotted aspiration , patient has symptomatic  period of orthostatic hypotension respond to fluid bolus , no acute events over night , seen by EPS no further work up recommended  cardiology follow up noted more IV hydration  , no over night events no SOB or desaturation     In the ED, pt was given 500cc NS.   (31 May 2024 22:03)         OBJECTIVE:  ICU Vital Signs Last 24 Hrs  T(C): 37 (02 Jun 2024 13:30), Max: 37 (02 Jun 2024 10:56)  T(F): 98.6 (02 Jun 2024 13:30), Max: 98.6 (02 Jun 2024 10:56)  HR: 58 (02 Jun 2024 13:30) (53 - 58)  BP: 124/79 (02 Jun 2024 13:30) (118/48 - 124/79)  BP(mean): --  ABP: --  ABP(mean): --  RR: 17 (02 Jun 2024 13:30) (16 - 17)  SpO2: 100% (02 Jun 2024 13:30) (100% - 100%)    O2 Parameters below as of 02 Jun 2024 13:30  Patient On (Oxygen Delivery Method): room air      PHYSICAL EXAM:Daily Height in cm: 172.72 (31 May 2024 15:50)  elderly female in no acute distress   Daily   HEENT:     + NCAT  + EOMI  - Conjuctival edema   - Icterus   - Thrush   - ETT  - NGT/OGT  Neck:         + FROM    + JVD     - Nodes     - Masses    + Mid-line trachea   - Tracheostomy  Chest:            normal findings   ILD   Lungs:          + CTA   - Rhonchi    - Rales    - Wheezing     - Decreased BS   - Dullness R L  Cardiac:       + S1 + S2    + RRR   - Irregular   - S3  - S4    - Murmurs   - Rub   - Hamman’s sign   Abdomen:    + BS     + Soft    + Non-tender     - Distended    - Organomegaly  - PEG  Extremities:   - Cyanosis U/L   - Clubbing  U/L  - LE/UE Edema   + Capillary refill    + Pulses   Neuro:        + Awake   +  Alert   - Confused   - Lethargic   - Sedated   - Generalized Weakness  Skin:        - Rashes    - Erythema   + Normal incisions   + IV sites intact  - Sacral decubitus             HOSPITAL MEDICATIONS: All mediciations reviewed and analyzed  MEDICATIONS  (STANDING):  aspirin enteric coated 81 milliGRAM(s) Oral daily  atorvastatin 10 milliGRAM(s) Oral at bedtime  clopidogrel Tablet 75 milliGRAM(s) Oral daily  enoxaparin Injectable 40 milliGRAM(s) SubCutaneous every 24 hours  famotidine    Tablet 20 milliGRAM(s) Oral daily  ferrous    sulfate 325 milliGRAM(s) Oral daily  multivitamin 1 Tablet(s) Oral daily  sodium chloride 0.9%. 1000 milliLiter(s) (75 mL/Hr) IV Continuous <Continuous>    MEDICATIONS  (PRN):  melatonin 3 milliGRAM(s) Oral at bedtime PRN Insomnia    LABS: All Lab data reviewed and analyzed                          11.4   4.58  )-----------( 195      ( 03 Jun 2024 06:45 )             33.0    06-03    138  |  105  |  18  ----------------------------<  86  4.0   |  23  |  0.74    Ca    8.8      03 Jun 2024 06:45  Phos  3.5     06-03  Mg     1.70     06-03      Ca    8.9      01 Jun 2024 06:25  Phos  4.4     06-01  Mg     1.80     06-01    TPro  7.4  /  Alb  4.2  /  TBili  0.3  /  DBili  x   /  AST  28  /  ALT  20  /  AlkPhos  74  05-31    PT/INR - ( 31 May 2024 17:09 )   PT: 11.6 sec;   INR: 1.04 ratio         PTT - ( 31 May 2024 17:09 )  PTT:30.1 sec LIVER FUNCTIONS - ( 31 May 2024 17:09 )  Alb: 4.2 g/dL / Pro: 7.4 g/dL / ALK PHOS: 74 U/L / ALT: 20 U/L / AST: 28 U/L / GGT: x           RADIOLOGY: - Reviewed and analyzed 
DONNA VU  MRN#: [id number]  Subjective: pulmonary consultation : SOB , presyncope , bilateral multiple pulmonary nodules date od service 6/.3/2024  79 y/o F with pmh of CVA (Feb 2023) and vertigo p/w dyspnea and near syncope.  Pt reports she was at PT today and working on one of the machines there when she became very dyspneic, and felt as if she would pass out.  She did not have chest pain or palpitations.  Daughters state that they have noticed pt has appeared very dyspneic with minimal exertion for for the past several months, but pt typically does not complain to them about these symptoms.  Pt reports chronic vertigo with nausea and occasional vomiting, but no change in these symptoms recently.  No recent cold symptoms, cough, abd pain, or diarrhea.      Pt previously was living in [place] and was admitted in a hospital there last February for CVA.  At that time, EMS was called, because pt did not answer her door for 2 days, and was found on the floor confused.  Daughters report that while admitted, she was diagnosed with a CVA.  She had workup with cath during that admission, but unsure what it showed.  She also had ILR placed, but has not used the recorder remote.  , patient has CT angiogram negative for PE , but it shows  multiple pulmonary nodules , denies weight loss coughing or hemoptysis , no repotted aspiration , patient has symptomatic  period of orthostatic hypotension respond to fluid bolus , no acute events over night , seen by EPS no further work up recommended     In the ED, pt was given 500cc NS.   (31 May 2024 22:03)         OBJECTIVE:  ICU Vital Signs Last 24 Hrs  T(C): 37 (02 Jun 2024 13:30), Max: 37 (02 Jun 2024 10:56)  T(F): 98.6 (02 Jun 2024 13:30), Max: 98.6 (02 Jun 2024 10:56)  HR: 58 (02 Jun 2024 13:30) (53 - 58)  BP: 124/79 (02 Jun 2024 13:30) (118/48 - 124/79)  BP(mean): --  ABP: --  ABP(mean): --  RR: 17 (02 Jun 2024 13:30) (16 - 17)  SpO2: 100% (02 Jun 2024 13:30) (100% - 100%)    O2 Parameters below as of 02 Jun 2024 13:30  Patient On (Oxygen Delivery Method): room air      PHYSICAL EXAM:Daily Height in cm: 172.72 (31 May 2024 15:50)  elderly female in no acute distress   Daily   HEENT:     + NCAT  + EOMI  - Conjuctival edema   - Icterus   - Thrush   - ETT  - NGT/OGT  Neck:         + FROM    + JVD     - Nodes     - Masses    + Mid-line trachea   - Tracheostomy  Chest:            normal findings   ILD   Lungs:          + CTA   - Rhonchi    - Rales    - Wheezing     - Decreased BS   - Dullness R L  Cardiac:       + S1 + S2    + RRR   - Irregular   - S3  - S4    - Murmurs   - Rub   - Hamman’s sign   Abdomen:    + BS     + Soft    + Non-tender     - Distended    - Organomegaly  - PEG  Extremities:   - Cyanosis U/L   - Clubbing  U/L  - LE/UE Edema   + Capillary refill    + Pulses   Neuro:        + Awake   +  Alert   - Confused   - Lethargic   - Sedated   - Generalized Weakness  Skin:        - Rashes    - Erythema   + Normal incisions   + IV sites intact  - Sacral decubitus             HOSPITAL MEDICATIONS: All mediciations reviewed and analyzed  MEDICATIONS  (STANDING):  aspirin enteric coated 81 milliGRAM(s) Oral daily  atorvastatin 10 milliGRAM(s) Oral at bedtime  clopidogrel Tablet 75 milliGRAM(s) Oral daily  enoxaparin Injectable 40 milliGRAM(s) SubCutaneous every 24 hours  famotidine    Tablet 20 milliGRAM(s) Oral daily  ferrous    sulfate 325 milliGRAM(s) Oral daily  multivitamin 1 Tablet(s) Oral daily  sodium chloride 0.9%. 1000 milliLiter(s) (75 mL/Hr) IV Continuous <Continuous>    MEDICATIONS  (PRN):  melatonin 3 milliGRAM(s) Oral at bedtime PRN Insomnia    LABS: All Lab data reviewed and analyzed                          11.4   4.58  )-----------( 195      ( 03 Jun 2024 06:45 )             33.0    06-03    138  |  105  |  18  ----------------------------<  86  4.0   |  23  |  0.74    Ca    8.8      03 Jun 2024 06:45  Phos  3.5     06-03  Mg     1.70     06-03      Ca    8.9      01 Jun 2024 06:25  Phos  4.4     06-01  Mg     1.80     06-01    TPro  7.4  /  Alb  4.2  /  TBili  0.3  /  DBili  x   /  AST  28  /  ALT  20  /  AlkPhos  74  05-31    PT/INR - ( 31 May 2024 17:09 )   PT: 11.6 sec;   INR: 1.04 ratio         PTT - ( 31 May 2024 17:09 )  PTT:30.1 sec LIVER FUNCTIONS - ( 31 May 2024 17:09 )  Alb: 4.2 g/dL / Pro: 7.4 g/dL / ALK PHOS: 74 U/L / ALT: 20 U/L / AST: 28 U/L / GGT: x           RADIOLOGY: - Reviewed and analyzed 
CARDIOLOGY FOLLOW UP - Dr. Navarro  DATE OF SERVICE: 6/6/24    CC no cp or sob  dizziness improving       REVIEW OF SYSTEMS:  CONSTITUTIONAL: No fever, weight loss, or fatigue  RESPIRATORY: No cough, wheezing, chills or hemoptysis; No Shortness of Breath  CARDIOVASCULAR: No chest pain, palpitations, passing out, dizziness, or leg swelling  GASTROINTESTINAL: No abdominal or epigastric pain. No nausea, vomiting, or hematemesis; No diarrhea or constipation. No melena or hematochezia.  VASCULAR: No edema     PHYSICAL EXAM:  T(C): 36.6 (06-06-24 @ 06:00), Max: 36.7 (06-05-24 @ 11:00)  HR: 52 (06-06-24 @ 06:00) (52 - 59)  BP: 126/61 (06-06-24 @ 06:00) (125/68 - 136/67)  RR: 18 (06-06-24 @ 06:00) (16 - 18)  SpO2: 100% (06-06-24 @ 06:00) (100% - 100%)  Wt(kg): --  I&O's Summary    05 Jun 2024 07:01  -  06 Jun 2024 07:00  --------------------------------------------------------  IN: 0 mL / OUT: 900 mL / NET: -900 mL        Appearance: Normal	  Cardiovascular: Normal S1 S2,RRR, No JVD, No murmurs  Respiratory: Lungs clear to auscultation	  Gastrointestinal:  Soft, Non-tender, + BS	  Extremities: Normal range of motion, No clubbing, cyanosis or edema      Home Medications:  aspirin 81 mg oral tablet: 1 tab(s) orally once a day (31 May 2024 22:08)  famotidine 40 mg oral tablet: 1 tab(s) orally once a day (31 May 2024 22:08)  ferrous sulfate 324 mg (65 mg elemental iron) oral tablet: 1 tab(s) orally once a day (31 May 2024 22:09)  melatonin 3 mg oral tablet: 2 tab(s) orally once a day (at bedtime) (31 May 2024 22:09)  Multiple Vitamins oral tablet: 1 tab(s) orally once a day (31 May 2024 22:09)  Plavix 75 mg oral tablet: 1 tab(s) orally once a day (31 May 2024 22:08)      MEDICATIONS  (STANDING):  aspirin enteric coated 81 milliGRAM(s) Oral daily  atorvastatin 10 milliGRAM(s) Oral at bedtime  clopidogrel Tablet 75 milliGRAM(s) Oral daily  enoxaparin Injectable 40 milliGRAM(s) SubCutaneous every 24 hours  famotidine    Tablet 20 milliGRAM(s) Oral daily  ferrous    sulfate 325 milliGRAM(s) Oral daily  multivitamin 1 Tablet(s) Oral daily  sodium chloride 0.9%. 1000 milliLiter(s) (75 mL/Hr) IV Continuous <Continuous>      TELEMETRY: SB hr 50s 	    ECG:  	  RADIOLOGY:   DIAGNOSTIC TESTING:  [ ] Echocardiogram:  [ ]  Catheterization:  [ ] Stress Test:    OTHER: 	    LABS:	 	    Troponin T, High Sensitivity Result: 10 ng/L (05-31 @ 18:52)  Troponin T, High Sensitivity Result: 10 ng/L (05-31 @ 17:09)  Creatine Kinase, Serum: 97 U/L [25 - 170] (05-31 @ 17:09)  CKMB Units: 1.3 ng/mL (05-31 @ 17:09)                    
CARDIOLOGY FOLLOW UP NOTE - DR. MARTE    Patient Name: DONNA VU    Date of Service: 06-03-24 @ 15:51    Patient seen and examined    Subjective:    cv: denies chest pain, dyspnea, palpitations, dizziness  pulmonary: denies cough  GI: denies abdominal pain, nausea, vomiting  vascular/legs: no edema   skin: no rash  ROS: otherwise negative   overnight events:      PHYSICAL EXAM:  T(C): 36.6 (06-03-24 @ 09:17), Max: 36.6 (06-03-24 @ 06:26)  HR: 53 (06-03-24 @ 11:51) (53 - 60)  BP: 124/76 (06-03-24 @ 11:51) (99/75 - 130/67)  RR: 18 (06-03-24 @ 11:51) (16 - 18)  SpO2: 100% (06-03-24 @ 11:51) (98% - 100%)  Wt(kg): --  I&O's Summary    Daily     Daily     Appearance: Normal	  Cardiovascular: Normal S1 S2,RRR, No JVD, No murmurs  Respiratory: Lungs clear to auscultation	  Gastrointestinal:  Soft, Non-tender, + BS	  Extremities: Normal range of motion, No clubbing, cyanosis or edema      Home Medications:  aspirin 81 mg oral tablet: 1 tab(s) orally once a day (31 May 2024 22:08)  famotidine 40 mg oral tablet: 1 tab(s) orally once a day (31 May 2024 22:08)  ferrous sulfate 324 mg (65 mg elemental iron) oral tablet: 1 tab(s) orally once a day (31 May 2024 22:09)  melatonin 3 mg oral tablet: 2 tab(s) orally once a day (at bedtime) (31 May 2024 22:09)  Multiple Vitamins oral tablet: 1 tab(s) orally once a day (31 May 2024 22:09)  Plavix 75 mg oral tablet: 1 tab(s) orally once a day (31 May 2024 22:08)      MEDICATIONS  (STANDING):  aspirin enteric coated 81 milliGRAM(s) Oral daily  atorvastatin 10 milliGRAM(s) Oral at bedtime  clopidogrel Tablet 75 milliGRAM(s) Oral daily  enoxaparin Injectable 40 milliGRAM(s) SubCutaneous every 24 hours  famotidine    Tablet 20 milliGRAM(s) Oral daily  ferrous    sulfate 325 milliGRAM(s) Oral daily  multivitamin 1 Tablet(s) Oral daily  sodium chloride 0.9%. 1000 milliLiter(s) (75 mL/Hr) IV Continuous <Continuous>      TELEMETRY: 	    ECG:  	  RADIOLOGY:   DIAGNOSTIC TESTING:  [ ] Echocardiogram:  [ ] Catheterization:  [ ] Stress Test:    OTHER: 	    LABS:	 	    CARDIAC MARKERS:        Troponin T, High Sensitivity Result: 10 ng/L (05-31 @ 18:52)  Troponin T, High Sensitivity Result: 10 ng/L (05-31 @ 17:09)                                11.4   4.58  )-----------( 195      ( 03 Jun 2024 06:45 )             33.0     06-03    138  |  105  |  18  ----------------------------<  86  4.0   |  23  |  0.74    Ca    8.8      03 Jun 2024 06:45  Phos  3.5     06-03  Mg     1.70     06-03      proBNP:     Lipid Profile:   HgA1c:     Creatinine: 0.74 mg/dL (06-03-24 @ 06:45)  Creatinine: 0.75 mg/dL (06-01-24 @ 06:25)  Creatinine: 0.73 mg/dL (05-31-24 @ 17:09)            
Patient seen and examined this am. No new events      MEDICATIONS:    aspirin enteric coated 81 milliGRAM(s) Oral daily  atorvastatin 10 milliGRAM(s) Oral at bedtime  clopidogrel Tablet 75 milliGRAM(s) Oral daily  enoxaparin Injectable 40 milliGRAM(s) SubCutaneous every 24 hours  famotidine    Tablet 20 milliGRAM(s) Oral daily  ferrous    sulfate 325 milliGRAM(s) Oral daily  melatonin 3 milliGRAM(s) Oral at bedtime PRN  multivitamin 1 Tablet(s) Oral daily  sodium chloride 0.9%. 1000 milliLiter(s) IV Continuous <Continuous>      LABS:                          11.3   5.33  )-----------( 198      ( 04 Jun 2024 06:20 )             33.9     06-04    138  |  104  |  20  ----------------------------<  79  4.2   |  22  |  0.77    Ca    9.1      04 Jun 2024 06:20  Phos  3.2     06-04  Mg     1.70     06-04      CAPILLARY BLOOD GLUCOSE          Urinalysis Basic - ( 04 Jun 2024 06:20 )    Color: x / Appearance: x / SG: x / pH: x  Gluc: 79 mg/dL / Ketone: x  / Bili: x / Urobili: x   Blood: x / Protein: x / Nitrite: x   Leuk Esterase: x / RBC: x / WBC x   Sq Epi: x / Non Sq Epi: x / Bacteria: x      I&O's Summary    03 Jun 2024 07:01  -  04 Jun 2024 07:00  --------------------------------------------------------  IN: 0 mL / OUT: 1300 mL / NET: -1300 mL      Vital Signs Last 24 Hrs  T(C): 36.8 (04 Jun 2024 09:28), Max: 36.8 (03 Jun 2024 16:46)  T(F): 98.3 (04 Jun 2024 09:28), Max: 98.3 (04 Jun 2024 09:28)  HR: 61 (04 Jun 2024 09:28) (53 - 62)  BP: 98/50 (04 Jun 2024 09:28) (98/50 - 150/98)  BP(mean): --  RR: 16 (04 Jun 2024 09:28) (15 - 18)  SpO2: 100% (04 Jun 2024 09:28) (100% - 100%)    Parameters below as of 04 Jun 2024 09:28  Patient On (Oxygen Delivery Method): room air          Physical Examination:   General - NAD    Neurologic Exam:  Mental status - Awake, Alert, Oriented to person, place, named the month but could not name the year. Speech fluent, repetition and naming intact. Follows simple and complex commands.     Cranial nerves - PERRLA, VFF, EOMI, face sensation (V1-V3) intact b/l, facial strength intact without asymmetry b/l, hearing intact b/l, tongue midline on protrusion with full lateral movement    Motor - Normal bulk and tone throughout. No pronator drift.  Strength testing            Deltoid      Biceps      Triceps                R            5                 5               5                     5                               L             5                 5               5                     5                               there is notable slight atrophy of the first dorsal interrosei.             Hip Flexion    Hip Extension    Knee Flexion    Knee Extension    Dorsiflexion    Plantar Flexion  R              5                           5                       5                           5                            5                          5  L              5                           5                        5                           5                            5                          5    Sensation - Light touch intact throughout    DTR's -             Biceps      Triceps     Brachioradialis                  Patellar    Ankle    Toes/plantar response  R             1+             1+                  1+                       1+            1+                 Down  L              1+             1+                 1+                        1+           1+                 Down    Coordination - Finger to Nose intact b/l. No tremors appreciated.     Gait and station - Not attempted for safety reasons.     Labs:                        10.7   4.15  )-----------( 182      ( 01 Jun 2024 06:25 )             30.7     06-01    143  |  109<H>  |  11  ----------------------------<  81  3.9   |  23  |  0.75    Ca    8.9      01 Jun 2024 06:25  Phos  4.4     06-01  Mg     1.80     06-01      CAPILLARY BLOOD GLUCOSE              CSF:                  Radiology:  MR Head No Cont:  (02 Jun 2024 11:20)  CT Head No Cont:  (31 May 2024 18:07)  < from: MR Head No Cont (06.02.24 @ 11:20) >  IMPRESSION:    No acute infarction, bleeding, or shift.  Moderate chronic microvascular ischemic changes.    < end of copied text >    TTE June 1st  CONCLUSIONS:      1. Left ventricular cavity is normal in size. Left ventricular wall thickness is normal. Left ventricular systolic function is normal with an ejection fraction of 67 % by Tee's method of disks. There are no regional wall motion abnormalities seen.   2. Normal left ventricular diastolic function, with normal filling pressure.   3. Normal right ventricular cavity size, with normal wall thickness, and normal systolic function.   4. The left atrium is normal in size.   5. The right atrium is normal in size.   6. No pericardial effusion seen.   7. No prior echocardiogram is available for comparison.    
CARDIOLOGY FOLLOW UP NOTE - DR. MARTE    Patient Name: DONNA VU    Date of Service: 06-02-24 @ 11:20    events noted      Subjective:    cv: denies chest pain, dyspnea, palpitations, dizziness  pulmonary: denies cough  GI: denies abdominal pain, nausea, vomiting  vascular/legs: no edema   skin: no rash  ROS: otherwise negative   overnight events:      PHYSICAL EXAM:  T(C): 37 (06-02-24 @ 10:56), Max: 37.1 (06-01-24 @ 12:49)  HR: 53 (06-02-24 @ 10:56) (53 - 55)  BP: 118/48 (06-02-24 @ 10:56) (118/48 - 140/64)  RR: 17 (06-02-24 @ 10:56) (16 - 18)  SpO2: 100% (06-02-24 @ 10:56) (98% - 100%)  Wt(kg): --  I&O's Summary    Daily     Daily     Appearance: Normal	  Cardiovascular: Normal S1 S2,RRR, No JVD, No murmurs  Respiratory: Lungs clear to auscultation	  Gastrointestinal:  Soft, Non-tender, + BS	  Extremities: Normal range of motion, No clubbing, cyanosis or edema      Home Medications:  aspirin 81 mg oral tablet: 1 tab(s) orally once a day (31 May 2024 22:08)  famotidine 40 mg oral tablet: 1 tab(s) orally once a day (31 May 2024 22:08)  ferrous sulfate 324 mg (65 mg elemental iron) oral tablet: 1 tab(s) orally once a day (31 May 2024 22:09)  melatonin 3 mg oral tablet: 2 tab(s) orally once a day (at bedtime) (31 May 2024 22:09)  Multiple Vitamins oral tablet: 1 tab(s) orally once a day (31 May 2024 22:09)  Plavix 75 mg oral tablet: 1 tab(s) orally once a day (31 May 2024 22:08)      MEDICATIONS  (STANDING):  aspirin enteric coated 81 milliGRAM(s) Oral daily  atorvastatin 10 milliGRAM(s) Oral at bedtime  clopidogrel Tablet 75 milliGRAM(s) Oral daily  enoxaparin Injectable 40 milliGRAM(s) SubCutaneous every 24 hours  famotidine    Tablet 20 milliGRAM(s) Oral daily  ferrous    sulfate 325 milliGRAM(s) Oral daily  multivitamin 1 Tablet(s) Oral daily  sodium chloride 0.9%. 1000 milliLiter(s) (75 mL/Hr) IV Continuous <Continuous>      TELEMETRY: 	    ECG:  	  RADIOLOGY:   DIAGNOSTIC TESTING:  [ ] Echocardiogram:  [ ] Catheterization:  [ ] Stress Test:    OTHER: 	    LABS:	 	    CARDIAC MARKERS:        Troponin T, High Sensitivity Result: 10 ng/L (05-31 @ 18:52)  Troponin T, High Sensitivity Result: 10 ng/L (05-31 @ 17:09)                                10.7   4.15  )-----------( 182      ( 01 Jun 2024 06:25 )             30.7     06-01    143  |  109<H>  |  11  ----------------------------<  81  3.9   |  23  |  0.75    Ca    8.9      01 Jun 2024 06:25  Phos  4.4     06-01  Mg     1.80     06-01    TPro  7.4  /  Alb  4.2  /  TBili  0.3  /  DBili  x   /  AST  28  /  ALT  20  /  AlkPhos  74  05-31    proBNP:   PT/INR - ( 31 May 2024 17:09 )   PT: 11.6 sec;   INR: 1.04 ratio         PTT - ( 31 May 2024 17:09 )  PTT:30.1 sec  Lipid Profile:   HgA1c:     Creatinine: 0.75 mg/dL (06-01-24 @ 06:25)  Creatinine: 0.73 mg/dL (05-31-24 @ 17:09)            
DONNA VU  MRN#: [id number]  Subjective: pulmonary consultation : SOB , presyncope , bilateral multiple pulmonary nodules date od service 6/4/2024  77 y/o F with pmh of CVA (Feb 2023) and vertigo p/w dyspnea and near syncope.  Pt reports she was at PT today and working on one of the machines there when she became very dyspneic, and felt as if she would pass out.  She did not have chest pain or palpitations.  Daughters state that they have noticed pt has appeared very dyspneic with minimal exertion for for the past several months, but pt typically does not complain to them about these symptoms.  Pt reports chronic vertigo with nausea and occasional vomiting, but no change in these symptoms recently.  No recent cold symptoms, cough, abd pain, or diarrhea.      Pt previously was living in [place] and was admitted in a hospital there last February for CVA.  At that time, EMS was called, because pt did not answer her door for 2 days, and was found on the floor confused.  Daughters report that while admitted, she was diagnosed with a CVA.  She had workup with cath during that admission, but unsure what it showed.  She also had ILR placed, but has not used the recorder remote.  ,the  patient has CT angiogram negative for PE , but it shows  multiple pulmonary nodules , denies weight loss coughing or hemoptysis , no repotted aspiration , patient has symptomatic  period of orthostatic hypotension respond to fluid bolus , no acute events over night , seen by EPS no further work up recommended  cardiology follow up noted more IV hydration      In the ED, pt was given 500cc NS.   (31 May 2024 22:03)         OBJECTIVE:  ICU Vital Signs Last 24 Hrs  T(C): 37 (02 Jun 2024 13:30), Max: 37 (02 Jun 2024 10:56)  T(F): 98.6 (02 Jun 2024 13:30), Max: 98.6 (02 Jun 2024 10:56)  HR: 58 (02 Jun 2024 13:30) (53 - 58)  BP: 124/79 (02 Jun 2024 13:30) (118/48 - 124/79)  BP(mean): --  ABP: --  ABP(mean): --  RR: 17 (02 Jun 2024 13:30) (16 - 17)  SpO2: 100% (02 Jun 2024 13:30) (100% - 100%)    O2 Parameters below as of 02 Jun 2024 13:30  Patient On (Oxygen Delivery Method): room air      PHYSICAL EXAM:Daily Height in cm: 172.72 (31 May 2024 15:50)  elderly female in no acute distress   Daily   HEENT:     + NCAT  + EOMI  - Conjuctival edema   - Icterus   - Thrush   - ETT  - NGT/OGT  Neck:         + FROM    + JVD     - Nodes     - Masses    + Mid-line trachea   - Tracheostomy  Chest:            normal findings   ILD   Lungs:          + CTA   - Rhonchi    - Rales    - Wheezing     - Decreased BS   - Dullness R L  Cardiac:       + S1 + S2    + RRR   - Irregular   - S3  - S4    - Murmurs   - Rub   - Hamman’s sign   Abdomen:    + BS     + Soft    + Non-tender     - Distended    - Organomegaly  - PEG  Extremities:   - Cyanosis U/L   - Clubbing  U/L  - LE/UE Edema   + Capillary refill    + Pulses   Neuro:        + Awake   +  Alert   - Confused   - Lethargic   - Sedated   - Generalized Weakness  Skin:        - Rashes    - Erythema   + Normal incisions   + IV sites intact  - Sacral decubitus             HOSPITAL MEDICATIONS: All mediciations reviewed and analyzed  MEDICATIONS  (STANDING):  aspirin enteric coated 81 milliGRAM(s) Oral daily  atorvastatin 10 milliGRAM(s) Oral at bedtime  clopidogrel Tablet 75 milliGRAM(s) Oral daily  enoxaparin Injectable 40 milliGRAM(s) SubCutaneous every 24 hours  famotidine    Tablet 20 milliGRAM(s) Oral daily  ferrous    sulfate 325 milliGRAM(s) Oral daily  multivitamin 1 Tablet(s) Oral daily  sodium chloride 0.9%. 1000 milliLiter(s) (75 mL/Hr) IV Continuous <Continuous>    MEDICATIONS  (PRN):  melatonin 3 milliGRAM(s) Oral at bedtime PRN Insomnia    LABS: All Lab data reviewed and analyzed                          11.4   4.58  )-----------( 195      ( 03 Jun 2024 06:45 )             33.0    06-03    138  |  105  |  18  ----------------------------<  86  4.0   |  23  |  0.74    Ca    8.8      03 Jun 2024 06:45  Phos  3.5     06-03  Mg     1.70     06-03      Ca    8.9      01 Jun 2024 06:25  Phos  4.4     06-01  Mg     1.80     06-01    TPro  7.4  /  Alb  4.2  /  TBili  0.3  /  DBili  x   /  AST  28  /  ALT  20  /  AlkPhos  74  05-31    PT/INR - ( 31 May 2024 17:09 )   PT: 11.6 sec;   INR: 1.04 ratio         PTT - ( 31 May 2024 17:09 )  PTT:30.1 sec LIVER FUNCTIONS - ( 31 May 2024 17:09 )  Alb: 4.2 g/dL / Pro: 7.4 g/dL / ALK PHOS: 74 U/L / ALT: 20 U/L / AST: 28 U/L / GGT: x           RADIOLOGY: - Reviewed and analyzed 
CARDIOLOGY FOLLOW UP - Dr. Navarro  DATE OF SERVICE: 6/4/24    CC  Endorsing dizziness w standing  No cp, no sob     REVIEW OF SYSTEMS:  CONSTITUTIONAL: No fever, weight loss, or fatigue  RESPIRATORY: No cough, wheezing, chills or hemoptysis; No Shortness of Breath  CARDIOVASCULAR: No chest pain, palpitations, passing out, dizziness, or leg swelling  GASTROINTESTINAL: No abdominal or epigastric pain. No nausea, vomiting, or hematemesis; No diarrhea or constipation. No melena or hematochezia.  VASCULAR: No edema     PHYSICAL EXAM:  T(C): 36.8 (06-04-24 @ 09:28), Max: 36.8 (06-03-24 @ 16:46)  HR: 61 (06-04-24 @ 09:28) (53 - 62)  BP: 98/50 (06-04-24 @ 09:28) (98/50 - 150/98)  RR: 16 (06-04-24 @ 09:28) (15 - 18)  SpO2: 100% (06-04-24 @ 09:28) (100% - 100%)  Wt(kg): --  I&O's Summary    03 Jun 2024 07:01  -  04 Jun 2024 07:00  --------------------------------------------------------  IN: 0 mL / OUT: 1300 mL / NET: -1300 mL        Appearance: Normal	  Cardiovascular: Normal S1 S2,RRR, No JVD, No murmurs  Respiratory: Lungs clear to auscultation b/l   Gastrointestinal:  Soft, Non-tender, + BS	  Extremities: Normal range of motion, No clubbing, cyanosis or edema      Home Medications:  aspirin 81 mg oral tablet: 1 tab(s) orally once a day (31 May 2024 22:08)  famotidine 40 mg oral tablet: 1 tab(s) orally once a day (31 May 2024 22:08)  ferrous sulfate 324 mg (65 mg elemental iron) oral tablet: 1 tab(s) orally once a day (31 May 2024 22:09)  melatonin 3 mg oral tablet: 2 tab(s) orally once a day (at bedtime) (31 May 2024 22:09)  Multiple Vitamins oral tablet: 1 tab(s) orally once a day (31 May 2024 22:09)  Plavix 75 mg oral tablet: 1 tab(s) orally once a day (31 May 2024 22:08)      MEDICATIONS  (STANDING):  aspirin enteric coated 81 milliGRAM(s) Oral daily  atorvastatin 10 milliGRAM(s) Oral at bedtime  clopidogrel Tablet 75 milliGRAM(s) Oral daily  enoxaparin Injectable 40 milliGRAM(s) SubCutaneous every 24 hours  famotidine    Tablet 20 milliGRAM(s) Oral daily  ferrous    sulfate 325 milliGRAM(s) Oral daily  multivitamin 1 Tablet(s) Oral daily  sodium chloride 0.9%. 1000 milliLiter(s) (75 mL/Hr) IV Continuous <Continuous>      TELEMETRY: SB     ECG:  	  RADIOLOGY:   DIAGNOSTIC TESTING:  [ ] Echocardiogram:  [ ]  Catheterization:  [ ] Stress Test:    OTHER: 	    LABS:	 	    Troponin T, High Sensitivity Result: 10 ng/L (05-31 @ 18:52)  Troponin T, High Sensitivity Result: 10 ng/L (05-31 @ 17:09)  Creatine Kinase, Serum: 97 U/L [25 - 170] (05-31 @ 17:09)  CKMB Units: 1.3 ng/mL (05-31 @ 17:09)                          11.3   5.33  )-----------( 198      ( 04 Jun 2024 06:20 )             33.9     06-04    138  |  104  |  20  ----------------------------<  79  4.2   |  22  |  0.77    Ca    9.1      04 Jun 2024 06:20  Phos  3.2     06-04  Mg     1.70     06-04              
CARDIOLOGY FOLLOW UP - Dr. Navarro  DATE OF SERVICE: 6/5/24    CC  No cv complaints - dizziness improved     REVIEW OF SYSTEMS:  CONSTITUTIONAL: No fever, weight loss, or fatigue  RESPIRATORY: No cough, wheezing, chills or hemoptysis; No Shortness of Breath  CARDIOVASCULAR: No chest pain, palpitations, passing out, dizziness, or leg swelling  GASTROINTESTINAL: No abdominal or epigastric pain. No nausea, vomiting, or hematemesis; No diarrhea or constipation. No melena or hematochezia.  VASCULAR: No edema     PHYSICAL EXAM:  T(C): 36.6 (06-05-24 @ 06:57), Max: 36.7 (06-04-24 @ 21:58)  HR: 56 (06-05-24 @ 06:57) (53 - 56)  BP: 126/63 (06-05-24 @ 06:57) (123/65 - 142/88)  RR: 18 (06-05-24 @ 06:57) (17 - 18)  SpO2: 100% (06-05-24 @ 06:57) (100% - 100%)  Wt(kg): --  I&O's Summary      Appearance: Normal	  Cardiovascular: Normal S1 S2,RRR, No JVD, No murmurs  Respiratory: Lungs clear to auscultation b/l   Gastrointestinal:  Soft, Non-tender, + BS	  Extremities: Normal range of motion, No clubbing, cyanosis or edema      Home Medications:  aspirin 81 mg oral tablet: 1 tab(s) orally once a day (31 May 2024 22:08)  famotidine 40 mg oral tablet: 1 tab(s) orally once a day (31 May 2024 22:08)  ferrous sulfate 324 mg (65 mg elemental iron) oral tablet: 1 tab(s) orally once a day (31 May 2024 22:09)  melatonin 3 mg oral tablet: 2 tab(s) orally once a day (at bedtime) (31 May 2024 22:09)  Multiple Vitamins oral tablet: 1 tab(s) orally once a day (31 May 2024 22:09)  Plavix 75 mg oral tablet: 1 tab(s) orally once a day (31 May 2024 22:08)      MEDICATIONS  (STANDING):  aspirin enteric coated 81 milliGRAM(s) Oral daily  atorvastatin 10 milliGRAM(s) Oral at bedtime  clopidogrel Tablet 75 milliGRAM(s) Oral daily  enoxaparin Injectable 40 milliGRAM(s) SubCutaneous every 24 hours  famotidine    Tablet 20 milliGRAM(s) Oral daily  ferrous    sulfate 325 milliGRAM(s) Oral daily  multivitamin 1 Tablet(s) Oral daily  sodium chloride 0.9%. 1000 milliLiter(s) (75 mL/Hr) IV Continuous <Continuous>      TELEMETRY: SB 	    ECG:  	  RADIOLOGY:   DIAGNOSTIC TESTING:  [ ] Echocardiogram:  [ ]  Catheterization:  [ ] Stress Test:    OTHER: 	    LABS:	 	    Troponin T, High Sensitivity Result: 10 ng/L (05-31 @ 18:52)  Troponin T, High Sensitivity Result: 10 ng/L (05-31 @ 17:09)  Creatine Kinase, Serum: 97 U/L [25 - 170] (05-31 @ 17:09)  CKMB Units: 1.3 ng/mL (05-31 @ 17:09)                          11.3   5.33  )-----------( 198      ( 04 Jun 2024 06:20 )             33.9     06-04    138  |  104  |  20  ----------------------------<  79  4.2   |  22  |  0.77    Ca    9.1      04 Jun 2024 06:20  Phos  3.2     06-04  Mg     1.70     06-04              
DONNA VU  MRN#: [id number]  Subjective: pulmonary consultation : SOB , presyncope , bilateral multiple pulmonary nodules date od service 6.2/2024  77 y/o F with pmh of CVA (Feb 2023) and vertigo p/w dyspnea and near syncope.  Pt reports she was at PT today and working on one of the machines there when she became very dyspneic, and felt as if she would pass out.  She did not have chest pain or palpitations.  Daughters state that they have noticed pt has appeared very dyspneic with minimal exertion for for the past several months, but pt typically does not complain to them about these symptoms.  Pt reports chronic vertigo with nausea and occasional vomiting, but no change in these symptoms recently.  No recent cold symptoms, cough, abd pain, or diarrhea.      Pt previously was living in [place] and was admitted in a hospital there last February for CVA.  At that time, EMS was called, because pt did not answer her door for 2 days, and was found on the floor confused.  Daughters report that while admitted, she was diagnosed with a CVA.  She had workup with cath during that admission, but unsure what it showed.  She also had ILR placed, but has not used the recorder remote.  , patient has CT angiogram negative for PE , but it shows  multiple pulmonary nodules , denies weight loss coughing or hemoptysis , no repotted aspiration , patient has symptomatic  period of orthostatic hypotension respond to fluid bolus     In the ED, pt was given 500cc NS.   (31 May 2024 22:03)         OBJECTIVE:  ICU Vital Signs Last 24 Hrs  T(C): 37 (02 Jun 2024 13:30), Max: 37 (02 Jun 2024 10:56)  T(F): 98.6 (02 Jun 2024 13:30), Max: 98.6 (02 Jun 2024 10:56)  HR: 58 (02 Jun 2024 13:30) (53 - 58)  BP: 124/79 (02 Jun 2024 13:30) (118/48 - 124/79)  BP(mean): --  ABP: --  ABP(mean): --  RR: 17 (02 Jun 2024 13:30) (16 - 17)  SpO2: 100% (02 Jun 2024 13:30) (100% - 100%)    O2 Parameters below as of 02 Jun 2024 13:30  Patient On (Oxygen Delivery Method): room air      PHYSICAL EXAM:Daily Height in cm: 172.72 (31 May 2024 15:50)  elderly female in no acute distress   Daily   HEENT:     + NCAT  + EOMI  - Conjuctival edema   - Icterus   - Thrush   - ETT  - NGT/OGT  Neck:         + FROM    + JVD     - Nodes     - Masses    + Mid-line trachea   - Tracheostomy  Chest:            normal findings   ILD   Lungs:          + CTA   - Rhonchi    - Rales    - Wheezing     - Decreased BS   - Dullness R L  Cardiac:       + S1 + S2    + RRR   - Irregular   - S3  - S4    - Murmurs   - Rub   - Hamman’s sign   Abdomen:    + BS     + Soft    + Non-tender     - Distended    - Organomegaly  - PEG  Extremities:   - Cyanosis U/L   - Clubbing  U/L  - LE/UE Edema   + Capillary refill    + Pulses   Neuro:        + Awake   +  Alert   - Confused   - Lethargic   - Sedated   - Generalized Weakness  Skin:        - Rashes    - Erythema   + Normal incisions   + IV sites intact  - Sacral decubitus             HOSPITAL MEDICATIONS: All mediciations reviewed and analyzed  MEDICATIONS  (STANDING):  aspirin enteric coated 81 milliGRAM(s) Oral daily  atorvastatin 10 milliGRAM(s) Oral at bedtime  clopidogrel Tablet 75 milliGRAM(s) Oral daily  enoxaparin Injectable 40 milliGRAM(s) SubCutaneous every 24 hours  famotidine    Tablet 20 milliGRAM(s) Oral daily  ferrous    sulfate 325 milliGRAM(s) Oral daily  multivitamin 1 Tablet(s) Oral daily  sodium chloride 0.9%. 1000 milliLiter(s) (75 mL/Hr) IV Continuous <Continuous>    MEDICATIONS  (PRN):  melatonin 3 milliGRAM(s) Oral at bedtime PRN Insomnia    LABS: All Lab data reviewed and analyzed                        10.7   4.15  )-----------( 182      ( 01 Jun 2024 06:25 )             30.7    06-01    143  |  109<H>  |  11  ----------------------------<  81  3.9   |  23  |  0.75    Ca    8.9      01 Jun 2024 06:25  Phos  4.4     06-01  Mg     1.80     06-01    TPro  7.4  /  Alb  4.2  /  TBili  0.3  /  DBili  x   /  AST  28  /  ALT  20  /  AlkPhos  74  05-31    PT/INR - ( 31 May 2024 17:09 )   PT: 11.6 sec;   INR: 1.04 ratio         PTT - ( 31 May 2024 17:09 )  PTT:30.1 sec LIVER FUNCTIONS - ( 31 May 2024 17:09 )  Alb: 4.2 g/dL / Pro: 7.4 g/dL / ALK PHOS: 74 U/L / ALT: 20 U/L / AST: 28 U/L / GGT: x           RADIOLOGY: - Reviewed and analyzed 
DONNA VU  MRN#: [id number]  Subjective: pulmonary consultation : SOB , presyncope , bilateral multiple pulmonary nodules date od service 6/6/2024  77 y/o F with pmh of CVA (Feb 2023) and vertigo p/w dyspnea and near syncope.  Pt reports she was at PT today and working on one of the machines there when she became very dyspneic, and felt as if she would pass out.  She did not have chest pain or palpitations.  Daughters state that they have noticed pt has appeared very dyspneic with minimal exertion for for the past several months, but pt typically does not complain to them about these symptoms.  Pt reports chronic vertigo with nausea and occasional vomiting, but no change in these symptoms recently.  No recent cold symptoms, cough, abd pain, or diarrhea.      Pt previously was living in [place] and was admitted in a hospital there last February for CVA.  At that time, EMS was called, because pt did not answer her door for 2 days, and was found on the floor confused.  Daughters report that while admitted, she was diagnosed with a CVA.  She had workup with cath during that admission, but unsure what it showed.  She also had ILR placed, but has not used the recorder remote.  ,the  patient has CT angiogram negative for PE , but it shows  multiple pulmonary nodules , denies weight loss coughing or hemoptysis , no repotted aspiration , patient has symptomatic  period of orthostatic hypotension respond to fluid bolus , no acute events over night , seen by EPS no further work up recommended  cardiology follow up noted more IV hydration  , no over night events no SOB or desaturation , no desaturation no SOB     In the ED, pt was given 500cc NS.   (31 May 2024 22:03)         OBJECTIVE:  ICU Vital Signs Last 24 Hrs  T(C): 37 (02 Jun 2024 13:30), Max: 37 (02 Jun 2024 10:56)  T(F): 98.6 (02 Jun 2024 13:30), Max: 98.6 (02 Jun 2024 10:56)  HR: 58 (02 Jun 2024 13:30) (53 - 58)  BP: 124/79 (02 Jun 2024 13:30) (118/48 - 124/79)  BP(mean): --  ABP: --  ABP(mean): --  RR: 17 (02 Jun 2024 13:30) (16 - 17)  SpO2: 100% (02 Jun 2024 13:30) (100% - 100%)    O2 Parameters below as of 02 Jun 2024 13:30  Patient On (Oxygen Delivery Method): room air      PHYSICAL EXAM:Daily Height in cm: 172.72 (31 May 2024 15:50)  elderly female in no acute distress   Daily   HEENT:     + NCAT  + EOMI  - Conjuctival edema   - Icterus   - Thrush   - ETT  - NGT/OGT  Neck:         + FROM    + JVD     - Nodes     - Masses    + Mid-line trachea   - Tracheostomy  Chest:            normal findings   ILD   Lungs:          + CTA   - Rhonchi    - Rales    - Wheezing     - Decreased BS   - Dullness R L  Cardiac:       + S1 + S2    + RRR   - Irregular   - S3  - S4    - Murmurs   - Rub   - Hamman’s sign   Abdomen:    + BS     + Soft    + Non-tender     - Distended    - Organomegaly  - PEG  Extremities:   - Cyanosis U/L   - Clubbing  U/L  - LE/UE Edema   + Capillary refill    + Pulses   Neuro:        + Awake   +  Alert   - Confused   - Lethargic   - Sedated   - Generalized Weakness  Skin:        - Rashes    - Erythema   + Normal incisions   + IV sites intact  - Sacral decubitus             HOSPITAL MEDICATIONS: All mediciations reviewed and analyzed  MEDICATIONS  (STANDING):  aspirin enteric coated 81 milliGRAM(s) Oral daily  atorvastatin 10 milliGRAM(s) Oral at bedtime  clopidogrel Tablet 75 milliGRAM(s) Oral daily  enoxaparin Injectable 40 milliGRAM(s) SubCutaneous every 24 hours  famotidine    Tablet 20 milliGRAM(s) Oral daily  ferrous    sulfate 325 milliGRAM(s) Oral daily  multivitamin 1 Tablet(s) Oral daily  sodium chloride 0.9%. 1000 milliLiter(s) (75 mL/Hr) IV Continuous <Continuous>    MEDICATIONS  (PRN):  melatonin 3 milliGRAM(s) Oral at bedtime PRN Insomnia    LABS: All Lab data reviewed and analyzed                          11.4   4.58  )-----------( 195      ( 03 Jun 2024 06:45 )             33.0    06-03    138  |  105  |  18  ----------------------------<  86  4.0   |  23  |  0.74    Ca    8.8      03 Jun 2024 06:45  Phos  3.5     06-03  Mg     1.70     06-03      Ca    8.9      01 Jun 2024 06:25  Phos  4.4     06-01  Mg     1.80     06-01    TPro  7.4  /  Alb  4.2  /  TBili  0.3  /  DBili  x   /  AST  28  /  ALT  20  /  AlkPhos  74  05-31    PT/INR - ( 31 May 2024 17:09 )   PT: 11.6 sec;   INR: 1.04 ratio         PTT - ( 31 May 2024 17:09 )  PTT:30.1 sec LIVER FUNCTIONS - ( 31 May 2024 17:09 )  Alb: 4.2 g/dL / Pro: 7.4 g/dL / ALK PHOS: 74 U/L / ALT: 20 U/L / AST: 28 U/L / GGT: x           RADIOLOGY: - Reviewed and analyzed 
Patient is a 78y old  Female who presents with a chief complaint of Dyspnea, dizziness (05 Jun 2024 12:24)      SUBJECTIVE / OVERNIGHT EVENTS: cleared by cardiology for dc    MEDICATIONS  (STANDING):  aspirin enteric coated 81 milliGRAM(s) Oral daily  atorvastatin 10 milliGRAM(s) Oral at bedtime  clopidogrel Tablet 75 milliGRAM(s) Oral daily  enoxaparin Injectable 40 milliGRAM(s) SubCutaneous every 24 hours  famotidine    Tablet 20 milliGRAM(s) Oral daily  ferrous    sulfate 325 milliGRAM(s) Oral daily  multivitamin 1 Tablet(s) Oral daily  sodium chloride 0.9%. 1000 milliLiter(s) (75 mL/Hr) IV Continuous <Continuous>    MEDICATIONS  (PRN):  melatonin 3 milliGRAM(s) Oral at bedtime PRN Insomnia      Vital Signs Last 24 Hrs  T(F): 97.5 (06-05-24 @ 17:47), Max: 98 (06-05-24 @ 11:00)  HR: 59 (06-05-24 @ 17:47) (55 - 59)  BP: 136/67 (06-05-24 @ 17:47) (125/68 - 136/67)  RR: 17 (06-05-24 @ 17:47) (16 - 18)  SpO2: 100% (06-05-24 @ 17:47) (100% - 100%)  Telemetry:   CAPILLARY BLOOD GLUCOSE        I&O's Summary    05 Jun 2024 07:01  -  05 Jun 2024 22:15  --------------------------------------------------------  IN: 0 mL / OUT: 900 mL / NET: -900 mL        PHYSICAL EXAM:  GENERAL: NAD, well-developed  HEAD:  Atraumatic, Normocephalic  EYES: EOMI, PERRLA, conjunctiva and sclera clear  NECK: Supple, No JVD  CHEST/LUNG: Clear to auscultation bilaterally; No wheeze  HEART: Regular rate and rhythm; No murmurs, rubs, or gallops  ABDOMEN: Soft, Nontender, Nondistended; Bowel sounds present  EXTREMITIES:  2+ Peripheral Pulses, No clubbing, cyanosis, or edema  PSYCH: AAOx3  NEUROLOGY: non-focal  SKIN: No rashes or lesions    LABS:                        11.3   5.33  )-----------( 198      ( 04 Jun 2024 06:20 )             33.9     06-04    138  |  104  |  20  ----------------------------<  79  4.2   |  22  |  0.77    Ca    9.1      04 Jun 2024 06:20  Phos  3.2     06-04  Mg     1.70     06-04            Urinalysis Basic - ( 04 Jun 2024 06:20 )    Color: x / Appearance: x / SG: x / pH: x  Gluc: 79 mg/dL / Ketone: x  / Bili: x / Urobili: x   Blood: x / Protein: x / Nitrite: x   Leuk Esterase: x / RBC: x / WBC x   Sq Epi: x / Non Sq Epi: x / Bacteria: x        RADIOLOGY & ADDITIONAL TESTS:    Imaging Personally Reviewed:    Consultant(s) Notes Reviewed:      Care Discussed with Consultants/Other Providers:  
Patient is a 78y old  Female who presents with a chief complaint of Dyspnea, dizziness (03 Jun 2024 15:50)      SUBJECTIVE / OVERNIGHT EVENTS: ILR was interrogated no chalo, no pauses, no arrhythmia found     MEDICATIONS  (STANDING):  aspirin enteric coated 81 milliGRAM(s) Oral daily  atorvastatin 10 milliGRAM(s) Oral at bedtime  clopidogrel Tablet 75 milliGRAM(s) Oral daily  enoxaparin Injectable 40 milliGRAM(s) SubCutaneous every 24 hours  famotidine    Tablet 20 milliGRAM(s) Oral daily  ferrous    sulfate 325 milliGRAM(s) Oral daily  multivitamin 1 Tablet(s) Oral daily  sodium chloride 0.9%. 1000 milliLiter(s) (75 mL/Hr) IV Continuous <Continuous>    MEDICATIONS  (PRN):  melatonin 3 milliGRAM(s) Oral at bedtime PRN Insomnia      Vital Signs Last 24 Hrs  T(F): 98.2 (06-03-24 @ 16:46), Max: 98.2 (06-03-24 @ 16:46)  HR: 62 (06-03-24 @ 21:37) (53 - 62)  BP: 148/82 (06-03-24 @ 21:37) (99/75 - 150/98)  RR: 15 (06-03-24 @ 16:46) (15 - 18)  SpO2: 100% (06-03-24 @ 16:46) (98% - 100%)  Telemetry:   CAPILLARY BLOOD GLUCOSE        I&O's Summary    03 Jun 2024 07:01  -  03 Jun 2024 22:25  --------------------------------------------------------  IN: 0 mL / OUT: 800 mL / NET: -800 mL        PHYSICAL EXAM:  GENERAL: NAD, well-developed  HEAD:  Atraumatic, Normocephalic  EYES: EOMI, PERRLA, conjunctiva and sclera clear  NECK: Supple, No JVD  CHEST/LUNG: Clear to auscultation bilaterally; No wheeze  HEART: Regular rate and rhythm; No murmurs, rubs, or gallops  ABDOMEN: Soft, Nontender, Nondistended; Bowel sounds present  EXTREMITIES:  2+ Peripheral Pulses, No clubbing, cyanosis, or edema  PSYCH: AAOx3  NEUROLOGY: non-focal  SKIN: No rashes or lesions    LABS:                        11.4   4.58  )-----------( 195      ( 03 Jun 2024 06:45 )             33.0     06-03    138  |  105  |  18  ----------------------------<  86  4.0   |  23  |  0.74    Ca    8.8      03 Jun 2024 06:45  Phos  3.5     06-03  Mg     1.70     06-03            Urinalysis Basic - ( 03 Jun 2024 06:45 )    Color: x / Appearance: x / SG: x / pH: x  Gluc: 86 mg/dL / Ketone: x  / Bili: x / Urobili: x   Blood: x / Protein: x / Nitrite: x   Leuk Esterase: x / RBC: x / WBC x   Sq Epi: x / Non Sq Epi: x / Bacteria: x        RADIOLOGY & ADDITIONAL TESTS:    Imaging Personally Reviewed:    Consultant(s) Notes Reviewed:      Care Discussed with Consultants/Other Providers:  
Patient is a 78y old  Female who presents with a chief complaint of Dyspnea, dizziness (02 Jun 2024 18:16)      SUBJECTIVE / OVERNIGHT EVENTS: no new events, symptomatic w dizziness when doing orthostatics, + results. remains sinus chalo but improved, now in 50s    MEDICATIONS  (STANDING):  aspirin enteric coated 81 milliGRAM(s) Oral daily  atorvastatin 10 milliGRAM(s) Oral at bedtime  clopidogrel Tablet 75 milliGRAM(s) Oral daily  enoxaparin Injectable 40 milliGRAM(s) SubCutaneous every 24 hours  famotidine    Tablet 20 milliGRAM(s) Oral daily  ferrous    sulfate 325 milliGRAM(s) Oral daily  multivitamin 1 Tablet(s) Oral daily  sodium chloride 0.9%. 1000 milliLiter(s) (75 mL/Hr) IV Continuous <Continuous>    MEDICATIONS  (PRN):  melatonin 3 milliGRAM(s) Oral at bedtime PRN Insomnia      Vital Signs Last 24 Hrs  T(F): 98.6 (06-02-24 @ 13:30), Max: 98.6 (06-02-24 @ 10:56)  HR: 58 (06-02-24 @ 13:30) (53 - 58)  BP: 124/79 (06-02-24 @ 13:30) (118/48 - 124/79)  RR: 17 (06-02-24 @ 13:30) (16 - 17)  SpO2: 100% (06-02-24 @ 13:30) (100% - 100%)  Telemetry:   CAPILLARY BLOOD GLUCOSE        I&O's Summary      PHYSICAL EXAM:  GENERAL: NAD, well-developed  HEAD:  Atraumatic, Normocephalic  EYES: EOMI, PERRLA, conjunctiva and sclera clear  NECK: Supple, No JVD  CHEST/LUNG: Clear to auscultation bilaterally; No wheeze  HEART: Regular rate and rhythm; No murmurs, rubs, or gallops  ABDOMEN: Soft, Nontender, Nondistended; Bowel sounds present  EXTREMITIES:  2+ Peripheral Pulses, No clubbing, cyanosis, or edema  PSYCH: AAOx3  NEUROLOGY: non-focal  SKIN: No rashes or lesions    LABS:                        10.7   4.15  )-----------( 182      ( 01 Jun 2024 06:25 )             30.7     06-01    143  |  109<H>  |  11  ----------------------------<  81  3.9   |  23  |  0.75    Ca    8.9      01 Jun 2024 06:25  Phos  4.4     06-01  Mg     1.80     06-01            Urinalysis Basic - ( 01 Jun 2024 06:25 )    Color: x / Appearance: x / SG: x / pH: x  Gluc: 81 mg/dL / Ketone: x  / Bili: x / Urobili: x   Blood: x / Protein: x / Nitrite: x   Leuk Esterase: x / RBC: x / WBC x   Sq Epi: x / Non Sq Epi: x / Bacteria: x        RADIOLOGY & ADDITIONAL TESTS:    Imaging Personally Reviewed:    Consultant(s) Notes Reviewed:      Care Discussed with Consultants/Other Providers:  
Patient is a 78y old  Female who presents with a chief complaint of Dyspnea, dizziness (06 Jun 2024 11:19)      SUBJECTIVE / OVERNIGHT EVENTS: dc today    MEDICATIONS  (STANDING):  aspirin enteric coated 81 milliGRAM(s) Oral daily  atorvastatin 10 milliGRAM(s) Oral at bedtime  clopidogrel Tablet 75 milliGRAM(s) Oral daily  enoxaparin Injectable 40 milliGRAM(s) SubCutaneous every 24 hours  famotidine    Tablet 20 milliGRAM(s) Oral daily  ferrous    sulfate 325 milliGRAM(s) Oral daily  multivitamin 1 Tablet(s) Oral daily  sodium chloride 0.9%. 1000 milliLiter(s) (75 mL/Hr) IV Continuous <Continuous>    MEDICATIONS  (PRN):  melatonin 3 milliGRAM(s) Oral at bedtime PRN Insomnia      Vital Signs Last 24 Hrs  T(F): 97.6 (06-06-24 @ 18:22), Max: 97.8 (06-06-24 @ 06:00)  HR: 70 (06-06-24 @ 18:22) (52 - 70)  BP: 125/76 (06-06-24 @ 18:22) (107/61 - 126/61)  RR: 18 (06-06-24 @ 18:22) (18 - 18)  SpO2: 100% (06-06-24 @ 18:22) (100% - 100%)  Telemetry:   CAPILLARY BLOOD GLUCOSE      POCT Blood Glucose.: 120 mg/dL (06 Jun 2024 18:20)    I&O's Summary    05 Jun 2024 07:01  -  06 Jun 2024 07:00  --------------------------------------------------------  IN: 0 mL / OUT: 900 mL / NET: -900 mL        PHYSICAL EXAM:  GENERAL: NAD, well-developed  HEAD:  Atraumatic, Normocephalic  EYES: EOMI, PERRLA, conjunctiva and sclera clear  NECK: Supple, No JVD  CHEST/LUNG: Clear to auscultation bilaterally; No wheeze  HEART: Regular rate and rhythm; No murmurs, rubs, or gallops  ABDOMEN: Soft, Nontender, Nondistended; Bowel sounds present  EXTREMITIES:  2+ Peripheral Pulses, No clubbing, cyanosis, or edema  PSYCH: AAOx3  NEUROLOGY: non-focal  SKIN: No rashes or lesions    LABS:                    RADIOLOGY & ADDITIONAL TESTS:    Imaging Personally Reviewed:    Consultant(s) Notes Reviewed:      Care Discussed with Consultants/Other Providers:  
Patient is a 78y old  Female who presents with a chief complaint of Dyspnea, dizziness (04 Jun 2024 16:01)      SUBJECTIVE / OVERNIGHT EVENTS: no new events, ptn is symptomatic while having + orthostatics     MEDICATIONS  (STANDING):  aspirin enteric coated 81 milliGRAM(s) Oral daily  atorvastatin 10 milliGRAM(s) Oral at bedtime  clopidogrel Tablet 75 milliGRAM(s) Oral daily  enoxaparin Injectable 40 milliGRAM(s) SubCutaneous every 24 hours  famotidine    Tablet 20 milliGRAM(s) Oral daily  ferrous    sulfate 325 milliGRAM(s) Oral daily  multivitamin 1 Tablet(s) Oral daily  sodium chloride 0.9%. 1000 milliLiter(s) (75 mL/Hr) IV Continuous <Continuous>    MEDICATIONS  (PRN):  melatonin 3 milliGRAM(s) Oral at bedtime PRN Insomnia      Vital Signs Last 24 Hrs  T(F): 98.3 (06-04-24 @ 09:28), Max: 98.3 (06-04-24 @ 09:28)  HR: 53 (06-04-24 @ 15:00) (53 - 62)  BP: 123/65 (06-04-24 @ 15:00) (98/50 - 150/98)  RR: 18 (06-04-24 @ 15:00) (16 - 18)  SpO2: 100% (06-04-24 @ 15:00) (100% - 100%)  Telemetry:   CAPILLARY BLOOD GLUCOSE        I&O's Summary    03 Jun 2024 07:01  -  04 Jun 2024 07:00  --------------------------------------------------------  IN: 0 mL / OUT: 1300 mL / NET: -1300 mL        PHYSICAL EXAM:  GENERAL: NAD, well-developed  HEAD:  Atraumatic, Normocephalic  EYES: EOMI, PERRLA, conjunctiva and sclera clear  NECK: Supple, No JVD  CHEST/LUNG: Clear to auscultation bilaterally; No wheeze  HEART: Regular rate and rhythm; No murmurs, rubs, or gallops  ABDOMEN: Soft, Nontender, Nondistended; Bowel sounds present  EXTREMITIES:  2+ Peripheral Pulses, No clubbing, cyanosis, or edema  PSYCH: AAOx3  NEUROLOGY: non-focal  SKIN: No rashes or lesions    LABS:                        11.3   5.33  )-----------( 198      ( 04 Jun 2024 06:20 )             33.9     06-04    138  |  104  |  20  ----------------------------<  79  4.2   |  22  |  0.77    Ca    9.1      04 Jun 2024 06:20  Phos  3.2     06-04  Mg     1.70     06-04            Urinalysis Basic - ( 04 Jun 2024 06:20 )    Color: x / Appearance: x / SG: x / pH: x  Gluc: 79 mg/dL / Ketone: x  / Bili: x / Urobili: x   Blood: x / Protein: x / Nitrite: x   Leuk Esterase: x / RBC: x / WBC x   Sq Epi: x / Non Sq Epi: x / Bacteria: x        RADIOLOGY & ADDITIONAL TESTS:    Imaging Personally Reviewed:    Consultant(s) Notes Reviewed:      Care Discussed with Consultants/Other Providers:

## 2024-06-06 NOTE — PROVIDER CONTACT NOTE (OTHER) - REASON
Patient is experiencing vertigo and feels too weak to get up
No  labs
Patient is no longer experiencing vertigo and weakness
Patient is refusing Lovenox administration at this time.
Patient refusing orthostatics

## 2024-06-06 NOTE — DISCHARGE NOTE NURSING/CASE MANAGEMENT/SOCIAL WORK - PATIENT PORTAL LINK FT
You can access the FollowMyHealth Patient Portal offered by [facility] by registering at the following website: [email or web address]. By joining [facility]’s FollowMyHealth portal, you will also be able to view your health information using other applications (apps) compatible with our system.

## 2024-06-06 NOTE — PHYSICAL THERAPY INITIAL EVALUATION ADULT - PHYSICAL ASSIST/NONPHYSICAL ASSIST: SIT/SUPINE, REHAB EVAL
During PH phone call reg BP patient mention that his BP is \"jumping\" up to \"150\" because he takes polish OTC medication for UTI called \"Furagina\". Would like to speak with Doctor.   Please see Outreach message.   verbal cues/nonverbal cues (demo/gestures)/1 person assist

## 2024-06-06 NOTE — PROGRESS NOTE ADULT - PROBLEM SELECTOR PLAN 3
resolved, has pulm nodules, will need rpt CT chest in 8 weeks

## 2024-06-06 NOTE — PROVIDER CONTACT NOTE (OTHER) - NAME OF MD/NP/PA/DO NOTIFIED:
Bethel Eldridge (#[id number])
ACP Niurka Nava via teams
Nikita Bermudez (#[id number])
ALEXIA Nava via teams
Bethel Eldridge (TEAMS)

## 2024-06-06 NOTE — PROGRESS NOTE ADULT - REASON FOR ADMISSION
Dyspnea, dizziness

## 2024-06-06 NOTE — PROVIDER CONTACT NOTE (OTHER) - ASSESSMENT
No AM labs were drawn today. Night shift RN unable to draw labs.
Patient refusing orthostatics. RN educated patient on importance of checking orthostatic BP. Patient agreeing to orthostatics after dinner.
Patient is A&Ox4, Patient denies pain, chest pain, shortness of breath, nausea, vomiting or dizziness.
Patient is A&Ox4, Patient denies pain, chest pain, shortness of breath, nausea, vomiting or dizziness.
Patient is A&Ox4, Patient complains of vertigo, nausea and weakness. Patient denies pain, chest pain, shortness of breath and, vomiting

## 2024-06-06 NOTE — PROGRESS NOTE ADULT - PROBLEM SELECTOR PLAN 5
dvt ppx w sc lovenox and GI ppx w H2B

## 2024-06-06 NOTE — PROGRESS NOTE ADULT - ASSESSMENT
A/P    77 y/o F with pmh of CVA (Feb 2023) and vertigo p/w dyspnea and near syncope.      #Bradycardia  -sinus chalo 40's not on avn meds  -chronic "vertigo" sx, ? symptomatic chalo   -ilr interrogation noted  -eps eval noted  -tele  -tte with normal lv, valve function    #Near syncope  -chronic vertigo sx  -tele with sinus chalo  no evidence of pathologic av block   -ilr eval no correlating events  -tte as above  -eps eval called  -orthostatics borderline, symptomatic while checking  -Give additional IVF   -mri noted      #Dyspnea.   -now resolved  -on room air  -no clinical HF  -cta with no pe, effusions, infiltrates  -?related to chalo/chronotropic incompetence       #CVA  -continue ASA and Plavix.  -brain mri noted    dvt ppx  dcp
A/P    77 y/o F with pmh of CVA (Feb 2023) and vertigo p/w dyspnea and near syncope.      #Bradycardia.   -sinus chalo 40's not on avn meds  -chronic "vertigo" sx, ? symptomatic chalo   -ilr interrogation noted  -eps eval called   -tele  -tte with normal lv, valve function    #Near syncope  -chronic vertigo sx  -tele with sinus chalo  no evidence of pathologic av block   -ilr eval no correlating events  -tte as above  -eps eval called  -orthostatics borderline, symptomatic while checking  -continue IVF, trend orthostatics      #Dyspnea.   -now resolved  -on room air  -no clinical HF  -cta with no pe, effusions, infiltrates  -check tte, r/o shunting   -?related to chalo/chronotropic incompetence  -eps eval     #CVA  -continue ASA and Plavix.  -f/u brain mri    dvt ppx    52 minutes spent on total encounter; more than 50% of the visit was spent counseling and/or coordinating care by the attending physician.    
A/P    79 y/o F with pmh of CVA (Feb 2023) and vertigo p/w dyspnea and near syncope.      #Bradycardia  -sinus chalo 40's not on avn meds  -chronic "vertigo" sx, ? symptomatic chalo   -ilr interrogation noted  -eps eval noted  -tele  -tte with normal lv, valve function    #Near syncope  -chronic vertigo sx  -tele with sinus chalo  no evidence of pathologic av block   -ilr eval no correlating events  -tte as above  -eps eval called  -orthostatics borderline, symptomatic while checking - now improved   -mri noted    #Dyspnea  -now resolved  -on room air  -no clinical HF  -cta with no pe, effusions, infiltrates  -?related to chalo/chronotropic incompetence       #CVA  -continue ASA and Plavix.  -brain mri noted    dvt ppx  CV Stable for DC 
Assessment and Recommendation:   SOB and presyncope   Bilateral multiple lung nodules   Chronic vertigo   orthostatic hypotension   H/O CVA   S/P ILD   thrombocytopenia possible MSD    sinus bradycardia   EP evaluation appreciated   follow CT scan in 6 month   DVT prophylaxis   estimated time is 40 minutes more than 50 % in counselling the patient and communicating with other consultant
78 years old woman with PMH of CVA and vertigo presenting for an episode of dyspnea, vertigo and incomprehensible speech. Exam is nonfocal. mild generalized weakness. MRI brain -  ILR interrogated    Impression, syncope, vertigo    Cardiac w/u  outpt vestibular therapy  outpt EMG  Can follow up with Neurology, Dr. Levon Ashby at [phone] .
A/P    77 y/o F with pmh of CVA (Feb 2023) and vertigo p/w dyspnea and near syncope.      #Bradycardia  -sinus chalo 40's not on avn meds  -chronic "vertigo" sx, ? symptomatic chalo   -ilr interrogation noted  No arrhythmia events noted corresponding to the reported symptoms.  -eps eval noted  -tte with normal lv, valve function    #Near syncope  -chronic vertigo sx  -tele with sinus chalo  no evidence of pathologic av block   -ilr eval no correlating events  -tte as above  -eps eval noted   -orthostatics borderline, symptomatic while checking - now improved   -mri noted    #Dyspnea  -now resolved  -on room air  -no clinical HF  -cta with no pe, effusions, infiltrates  -?related to chalo/chronotropic incompetence    #CVA  -continue ASA and Plavix.  -brain mri noted    dvt ppx  CV Stable for DC 
Assessment and Recommendation:   SOB and presyncope   Bilateral multiple lung nodules   Chronic vertigo   orthostatic hypotension   H/O CVA   S/P ILD   thrombocytopenia possible MSD    sinus bradycardia   EP evaluation   follow CT scan in 6 month   DVT prophylaxis   estimated time is 40 minutes more than 50 % in counselling the patient and communicating with other consultant
Assessment and Recommendation:   SOB and presyncope   Bilateral multiple lung nodules   Chronic vertigo   orthostatic hypotension   H/O CVA   S/P ILD   thrombocytopenia possible MSD    sinus bradycardia   EP evaluation   follow CT scan in 6 month   DVT prophylaxis   estimated time is 40 minutes more than 50 % in counselling the patient and communicating with other consultant
Assessment and Recommendation:   SOB and presyncope   Bilateral multiple lung nodules   Chronic vertigo   orthostatic hypotension   H/O CVA   S/P ILD   thrombocytopenia possible MSD    sinus bradycardia   EP evaluation appreciated   PFT as out patient   follow CT scan in 6 month   DVT prophylaxis   estimated time is 40 minutes more than 50 % in counselling the patient and communicating with other consultant
A/P    77 y/o F with pmh of CVA (Feb 2023) and vertigo p/w dyspnea and near syncope.      #Bradycardia.   -sinus chalo 40's not on avn meds  -chronic "vertigo" sx, ? symptomatic chalo   -ilr interrogation noted  -eps eval noted  -tele  -tte with normal lv, valve function    #Near syncope  -chronic vertigo sx  -tele with sinus chalo  no evidence of pathologic av block   -ilr eval no correlating events  -tte as above  -eps eval called  -orthostatics borderline, symptomatic while checking  -continue IVF, trend orthostatics  -mri noted\      #Dyspnea.   -now resolved  -on room air  -no clinical HF  -cta with no pe, effusions, infiltrates  -?related to chalo/chronotropic incompetence       #CVA  -continue ASA and Plavix.  -brain mri noted    dvt ppx  dcp    52 minutes spent on total encounter; more than 50% of the visit was spent counseling and/or coordinating care by the attending physician.    
Assessment and Recommendation:   SOB and presyncope   Bilateral multiple lung nodules   Chronic vertigo   orthostatic hypotension   H/O CVA   S/P ILD   thrombocytopenia possible MSD    sinus bradycardia   EP evaluation   follow CT scan in 6 month   DVT prophylaxis   estimated time is 40 minutes more than 50 % in counselling the patient and communicating with other consultant
77 y/o F with pmh of CVA (Feb 2023) and vertigo p/w dyspnea and near syncope. Found to be bradycardic off AVN blockers
79 y/o F with pmh of CVA (Feb 2023) and vertigo p/w dyspnea and near syncope. Found to be bradycardic off AVN blockers
79 y/o F with pmh of CVA (Feb 2023) and vertigo p/w dyspnea and near syncope. Found to be bradycardic off AVN blockers
As per mother pt has had fever and emesis x 5 days with only 2 wet diapers in the last 24 hours. Pt. noted by triage RN to not be buckled in car seat, when questioned by mother it is because baby "does not like it" - SW made aware
77 y/o F with pmh of CVA (Feb 2023) and vertigo p/w dyspnea and near syncope. Found to be bradycardic off AVN blockers
79 y/o F with pmh of CVA (Feb 2023) and vertigo p/w dyspnea and near syncope. Found to be bradycardic off AVN blockers

## 2024-06-06 NOTE — PROVIDER CONTACT NOTE (OTHER) - BACKGROUND
Discussed results with patient.  Recommend she come in for 6 min walk test.  She is agreeable.  Can we please call and schedule her?  
Pt come in 5/19  
Pt returned your call regarding test results.  Please call her back  
78 year od female admitted for shortness of breath.
admitted with shortness of breath, bradycardia, syncope.
78 year od female admitted for shortness of breath.
admitted with shortness of breath, bradycardia, syncope
78 year od female admitted for shortness of breath.

## 2024-06-06 NOTE — DISCHARGE NOTE NURSING/CASE MANAGEMENT/SOCIAL WORK - NSDCPEFALRISK_GEN_ALL_CORE
For information on Fall & Injury Prevention, visit: [email or web address] OR  [email or web address] OR  https://www.cdc.gov/steadi/patient.html

## 2024-06-06 NOTE — PROVIDER CONTACT NOTE (OTHER) - RECOMMENDATIONS
Pt called today - would like to discuss more of a private matter than Medical - although - he did have right hip injection francy [facility] in [facility] - 2 1/2 wks ago - w/no relief with numbness into leg - just wanted to pass that onto you as well - Pt asking you call at your earliest conveinence and pls and thank you
As per provider Nikita Bermudez (#[id number]), patient may be discharged home at this time.
As per provider Bethel Eldridge (#[id number]) please have patient attempt to move lower extremities as to regain strength, given history of vertigo and reassess.
As per provider Bethel Eldridge (TEAMS) no further interventions needed at this time. Discharge patient.

## 2024-06-06 NOTE — PROGRESS NOTE ADULT - PROBLEM SELECTOR PLAN 1
remains chalo but now improved in the 50s. EPS consult reviewed  ILR was interrogated no chalo, no pauses, no arrhythmia found
remains chalo but now improved in the 50s. EPS consult reviewed  ILR was interrogated no chalo, no pauses, no arrhythmia found
remains chalo but now improved in the 50s. EPS consult as per cardiology.
remains chalo but now improved in the 50s. EPS consult reviewed  ILR was interrogated no chalo, no pauses, no arrhythmia found
remains chalo but now improved in the 50s. EPS consult reviewed  ILR was interrogated no chalo, no pauses, no arrhythmia found

## 2024-06-06 NOTE — PHYSICAL THERAPY INITIAL EVALUATION ADULT - ADDITIONAL COMMENTS
Pt. reports ambulating with a rollator.    Pt. was left in bed post PT Evaluation, no apparent distress, all lines intact, HR 71 bpm, call bell within reach.

## 2024-06-06 NOTE — PHYSICAL THERAPY INITIAL EVALUATION ADULT - PERTINENT HX OF CURRENT PROBLEM, REHAB EVAL
Per documentation, pt. presented dyspnea and near syncope. Found to be bradycardic off AVN blockers. MRI head: No acute infarction, bleeding, or shift. Moderate chronic microvascular ischemic changes.

## 2024-06-06 NOTE — DISCHARGE NOTE NURSING/CASE MANAGEMENT/SOCIAL WORK - NSDCFUADDAPPT_GEN_ALL_CORE_FT
APPTS ARE READY TO BE MADE: [X] YES    Best Family or Patient Contact (if needed):    Additional Information about above appointments (if needed):    1: Dr Carlos A Blunt PCP in 1 week fr m discharge (likely 6/5)  2:   3:     Other comments or requests:

## 2024-06-06 NOTE — PROGRESS NOTE ADULT - PROVIDER SPECIALTY LIST ADULT
Cardiology
Cardiology
Pulmonology
Cardiology
Pulmonology
Cardiology
Cardiology
Neurology
Internal Medicine

## 2024-06-06 NOTE — PROVIDER CONTACT NOTE (OTHER) - SITUATION
No  labs
Patient is refusing Lovenox administration at this time. Teach back education provided about the indication and risks of not adhering to administration
Patient is experiencing vertigo and feels too weak to get up. Patient is unable
Patient is no longer experiencing vertigo and weakness
Patient refusing orthostatics

## 2024-06-06 NOTE — PROGRESS NOTE ADULT - PROBLEM SELECTOR PLAN 2
no events on tele. orthostatic initially, resolved post IVF
no events on tele. orthostatic on 6/1 but now resolved
no events on tele. orthostatic initially, resolved post IVF
no events on tele. orthostatic yest but now resolved
no events on tele. orthostatic while symptomatic. getting more IVF

## 2024-06-06 NOTE — PROGRESS NOTE ADULT - PROBLEM SELECTOR PLAN 4
MR brain w no new findings  cont statin, ASA, check lipid panel, HA1C

## 2024-06-06 NOTE — PHYSICAL THERAPY INITIAL EVALUATION ADULT - GENERAL OBSERVATIONS, REHAB EVAL
Consult received, chart reviewed. Patient received in bed, no apparent distress, +tele, HR 56 bpm. Pt. agreeable to participate in PT.

## 2024-06-06 NOTE — PROGRESS NOTE ADULT - TIME BILLING
agree with above  cv stable  ilr interrogation noted  eps eval noted  orthostatics borderline  IVF  tte with normal lv, valve function
Agree with above ACP note.  cv stable  clinically improved  cont current tx  dcp
agree with above  cv stable  ilr interrogation noted  eps eval noted  orthostatics borderline  IVF  tte with normal lv, valve function

## 2024-06-06 NOTE — PROVIDER CONTACT NOTE (OTHER) - ACTION/TREATMENT ORDERED:
As per provider Nikita Bermudez (#[id number]), patient may be discharged home at this time.
As per provider Bethel Eldridge (TEAMS) no further interventions needed at this time. Discharge patient.
Provider aware.
Provider aware. No need for AM labs today as patient cleared for discharge by cardiology.
As per provider Bethel Eldridge (#[id number]) please have patient attempt to move lower extremities as to regain strength, given history of vertigo and reassess.

## 2024-06-10 ENCOUNTER — TRANSCRIPTION ENCOUNTER (OUTPATIENT)
Age: 79
End: 2024-06-10

## 2024-06-11 ENCOUNTER — APPOINTMENT (OUTPATIENT)
Age: 79
End: 2024-06-11
Payer: MEDICARE

## 2024-06-11 ENCOUNTER — TRANSCRIPTION ENCOUNTER (OUTPATIENT)
Age: 79
End: 2024-06-11

## 2024-06-11 DIAGNOSIS — R42 DIZZINESS AND GIDDINESS: ICD-10-CM

## 2024-06-11 DIAGNOSIS — Z86.73 PERSONAL HISTORY OF TRANSIENT ISCHEMIC ATTACK (TIA), AND CEREBRAL INFARCTION W/OUT RESIDUAL DEFICITS: ICD-10-CM

## 2024-06-11 PROCEDURE — 99443: CPT | Mod: 93

## 2024-06-11 RX ORDER — FAMOTIDINE 40 MG/1
40 TABLET, FILM COATED ORAL DAILY
Refills: 0 | Status: ACTIVE | COMMUNITY

## 2024-06-11 RX ORDER — MULTIVIT-MIN/IRON FUM/FOLIC AC 7.5 MG-4
TABLET ORAL
Refills: 0 | Status: ACTIVE | COMMUNITY

## 2024-06-11 RX ORDER — CLOPIDOGREL 75 MG/1
75 TABLET, FILM COATED ORAL DAILY
Refills: 0 | Status: ACTIVE | COMMUNITY

## 2024-06-11 RX ORDER — ASPIRIN 81 MG/1
81 TABLET, COATED ORAL DAILY
Refills: 0 | Status: ACTIVE | COMMUNITY

## 2024-06-11 RX ORDER — ATORVASTATIN CALCIUM 10 MG/1
10 TABLET, FILM COATED ORAL DAILY
Refills: 0 | Status: ACTIVE | COMMUNITY

## 2024-06-11 RX ORDER — GLUCOSAMINE HCL/CHONDROITIN SU 500-400 MG
3 CAPSULE ORAL AT BEDTIME
Refills: 0 | Status: ACTIVE | COMMUNITY

## 2024-06-11 RX ORDER — FERROUS SULFATE TAB EC 325 MG (65 MG FE EQUIVALENT) 325 (65 FE) MG
325 (65 FE) TABLET DELAYED RESPONSE ORAL
Refills: 0 | Status: ACTIVE | COMMUNITY

## 2024-06-11 NOTE — HISTORY OF PRESENT ILLNESS
[Home] : at home, [unfilled] , at the time of the visit. [Other Location: e.g. Home (Enter Location, City,State)___] : at [unfilled] [Family Member] : family member [Verbal consent obtained from patient] : the patient, [unfilled] [FreeTextEntry1] : [facility] Hospitalization Follow up  [de-identified] : Ms. Loza is a 77 y/o man with pmhx of CVA, ILR, and chronic vertigo who presented to the hospital with dyspnea and dizziness. Admitted for further workup. EP, Cardiology, Neurology, Cardiology, and Pulmonary consulted: ILR interrogated with no cardiac events, Brain MRI showed no acute infarcts, Carotid dopplers performed with minimal findings. Chest CT negative for PE but found to have multiple lung nodules. Discharged home without home care services.  Ms. Loza was scheduled for telecommunication video but she was unable to join the platform due to technical issues.   Ms. Loza and her daughter, Fabián were present during the telephonic visit.  Currently, she reports that is doing well. Denies chest pain, shortness of rest with exertion/rest, fever/chills, poor appetite, and/or increased weakness. She remains with chronic intermittent vertigo and she takes Dramamine. Adhering to medications and has to follow up with Cardiologist, Dr. Abdul on 6/12/24.

## 2024-06-11 NOTE — HEALTH RISK ASSESSMENT
[No] : No [No falls in past year] : Patient reported no falls in the past year [Patient refused screening] : Patient refused screening

## 2024-06-11 NOTE — PLAN
[FreeTextEntry1] : The patient/family was informed about the NP's role/[facility] program, and an overview of transitional care was reviewed with the patient. Patient/family educated on topics of importance such as compliance with all provider visits, prescribed medication regimen, and low salt / heart-healthy diet. Patient/Family encouraged to call NP with any issues, concerns or questions, also educated to notify NP if experiencing CP, SOB, cough, increased mucus/phlegm production, abdominal discomfort/swelling, difficulty sleeping or lying flat, fever, chills, fatigue, weight gain of 2-3lbs in 24 hours or 5lbs in one week, dizziness, lightheadedness, n/v/d/c, swelling to extremities and/or any c/o or concerns. Reassurance provided.

## 2024-06-12 ENCOUNTER — TRANSCRIPTION ENCOUNTER (OUTPATIENT)
Age: 79
End: 2024-06-12
